# Patient Record
Sex: MALE | Race: WHITE | NOT HISPANIC OR LATINO | Employment: UNEMPLOYED | URBAN - METROPOLITAN AREA
[De-identification: names, ages, dates, MRNs, and addresses within clinical notes are randomized per-mention and may not be internally consistent; named-entity substitution may affect disease eponyms.]

---

## 2023-02-04 ENCOUNTER — HOSPITAL ENCOUNTER (INPATIENT)
Facility: MEDICAL CENTER | Age: 58
LOS: 1 days | DRG: 660 | End: 2023-02-05
Attending: EMERGENCY MEDICINE | Admitting: STUDENT IN AN ORGANIZED HEALTH CARE EDUCATION/TRAINING PROGRAM
Payer: COMMERCIAL

## 2023-02-04 ENCOUNTER — APPOINTMENT (OUTPATIENT)
Dept: RADIOLOGY | Facility: MEDICAL CENTER | Age: 58
DRG: 660 | End: 2023-02-04
Attending: EMERGENCY MEDICINE
Payer: COMMERCIAL

## 2023-02-04 DIAGNOSIS — N17.9 AKI (ACUTE KIDNEY INJURY) (HCC): ICD-10-CM

## 2023-02-04 DIAGNOSIS — R10.9 LEFT FLANK PAIN: ICD-10-CM

## 2023-02-04 DIAGNOSIS — R73.9 HYPERGLYCEMIA: ICD-10-CM

## 2023-02-04 DIAGNOSIS — N20.1 URETEROLITHIASIS: ICD-10-CM

## 2023-02-04 PROBLEM — N13.30 HYDRONEPHROSIS OF RIGHT KIDNEY: Status: ACTIVE | Noted: 2023-02-04

## 2023-02-04 PROBLEM — N20.0 NEPHROLITHIASIS: Status: ACTIVE | Noted: 2023-02-04

## 2023-02-04 PROBLEM — E87.29 HIGH ANION GAP METABOLIC ACIDOSIS: Status: ACTIVE | Noted: 2023-02-04

## 2023-02-04 PROBLEM — E11.9 TYPE 2 DIABETES MELLITUS (HCC): Status: ACTIVE | Noted: 2023-02-04

## 2023-02-04 PROBLEM — E87.1 HYPONATREMIA: Status: ACTIVE | Noted: 2023-02-04

## 2023-02-04 LAB
ALBUMIN SERPL BCP-MCNC: 4.1 G/DL (ref 3.2–4.9)
ALBUMIN/GLOB SERPL: 1.2 G/DL
ALP SERPL-CCNC: 148 U/L (ref 30–99)
ALT SERPL-CCNC: 8 U/L (ref 2–50)
ANION GAP SERPL CALC-SCNC: 19 MMOL/L (ref 7–16)
APPEARANCE UR: CLEAR
AST SERPL-CCNC: 13 U/L (ref 12–45)
B-OH-BUTYR SERPL-MCNC: 3.47 MMOL/L (ref 0.02–0.27)
BACTERIA #/AREA URNS HPF: NEGATIVE /HPF
BASOPHILS # BLD AUTO: 0.1 % (ref 0–1.8)
BASOPHILS # BLD: 0.01 K/UL (ref 0–0.12)
BILIRUB SERPL-MCNC: 0.9 MG/DL (ref 0.1–1.5)
BILIRUB UR QL STRIP.AUTO: NEGATIVE
BUN SERPL-MCNC: 37 MG/DL (ref 8–22)
CALCIUM ALBUM COR SERPL-MCNC: 9.3 MG/DL (ref 8.5–10.5)
CALCIUM SERPL-MCNC: 9.4 MG/DL (ref 8.5–10.5)
CHLORIDE SERPL-SCNC: 97 MMOL/L (ref 96–112)
CO2 SERPL-SCNC: 17 MMOL/L (ref 20–33)
COLOR UR: YELLOW
CREAT SERPL-MCNC: 3.32 MG/DL (ref 0.5–1.4)
EOSINOPHIL # BLD AUTO: 0 K/UL (ref 0–0.51)
EOSINOPHIL NFR BLD: 0 % (ref 0–6.9)
EPI CELLS #/AREA URNS HPF: ABNORMAL /HPF
ERYTHROCYTE [DISTWIDTH] IN BLOOD BY AUTOMATED COUNT: 43.4 FL (ref 35.9–50)
EST. AVERAGE GLUCOSE BLD GHB EST-MCNC: 258 MG/DL
GFR SERPLBLD CREATININE-BSD FMLA CKD-EPI: 21 ML/MIN/1.73 M 2
GLOBULIN SER CALC-MCNC: 3.5 G/DL (ref 1.9–3.5)
GLUCOSE SERPL-MCNC: 220 MG/DL (ref 65–99)
GLUCOSE UR STRIP.AUTO-MCNC: NEGATIVE MG/DL
HBA1C MFR BLD: 10.6 % (ref 4–5.6)
HCT VFR BLD AUTO: 43 % (ref 42–52)
HGB BLD-MCNC: 14.7 G/DL (ref 14–18)
HYALINE CASTS #/AREA URNS LPF: ABNORMAL /LPF
IMM GRANULOCYTES # BLD AUTO: 0.06 K/UL (ref 0–0.11)
IMM GRANULOCYTES NFR BLD AUTO: 0.6 % (ref 0–0.9)
KETONES UR STRIP.AUTO-MCNC: 40 MG/DL
LEUKOCYTE ESTERASE UR QL STRIP.AUTO: NEGATIVE
LIPASE SERPL-CCNC: 20 U/L (ref 11–82)
LYMPHOCYTES # BLD AUTO: 0.76 K/UL (ref 1–4.8)
LYMPHOCYTES NFR BLD: 7.7 % (ref 22–41)
MAGNESIUM SERPL-MCNC: 2.2 MG/DL (ref 1.5–2.5)
MCH RBC QN AUTO: 32.1 PG (ref 27–33)
MCHC RBC AUTO-ENTMCNC: 34.2 G/DL (ref 33.7–35.3)
MCV RBC AUTO: 93.9 FL (ref 81.4–97.8)
MICRO URNS: ABNORMAL
MONOCYTES # BLD AUTO: 0.4 K/UL (ref 0–0.85)
MONOCYTES NFR BLD AUTO: 4.1 % (ref 0–13.4)
NEUTROPHILS # BLD AUTO: 8.64 K/UL (ref 1.82–7.42)
NEUTROPHILS NFR BLD: 87.5 % (ref 44–72)
NITRITE UR QL STRIP.AUTO: NEGATIVE
NRBC # BLD AUTO: 0 K/UL
NRBC BLD-RTO: 0 /100 WBC
PH UR STRIP.AUTO: 5.5 [PH] (ref 5–8)
PLATELET # BLD AUTO: 250 K/UL (ref 164–446)
PMV BLD AUTO: 10.8 FL (ref 9–12.9)
POTASSIUM SERPL-SCNC: 4.7 MMOL/L (ref 3.6–5.5)
PROT SERPL-MCNC: 7.6 G/DL (ref 6–8.2)
PROT UR QL STRIP: NEGATIVE MG/DL
RBC # BLD AUTO: 4.58 M/UL (ref 4.7–6.1)
RBC # URNS HPF: ABNORMAL /HPF
RBC UR QL AUTO: ABNORMAL
SODIUM SERPL-SCNC: 133 MMOL/L (ref 135–145)
SP GR UR STRIP.AUTO: 1.01
UROBILINOGEN UR STRIP.AUTO-MCNC: 0.2 MG/DL
WBC # BLD AUTO: 9.9 K/UL (ref 4.8–10.8)
WBC #/AREA URNS HPF: ABNORMAL /HPF

## 2023-02-04 PROCEDURE — 81001 URINALYSIS AUTO W/SCOPE: CPT

## 2023-02-04 PROCEDURE — A9270 NON-COVERED ITEM OR SERVICE: HCPCS | Performed by: STUDENT IN AN ORGANIZED HEALTH CARE EDUCATION/TRAINING PROGRAM

## 2023-02-04 PROCEDURE — 700105 HCHG RX REV CODE 258: Performed by: EMERGENCY MEDICINE

## 2023-02-04 PROCEDURE — 85025 COMPLETE CBC W/AUTO DIFF WBC: CPT

## 2023-02-04 PROCEDURE — 99285 EMERGENCY DEPT VISIT HI MDM: CPT

## 2023-02-04 PROCEDURE — 36415 COLL VENOUS BLD VENIPUNCTURE: CPT

## 2023-02-04 PROCEDURE — 83036 HEMOGLOBIN GLYCOSYLATED A1C: CPT

## 2023-02-04 PROCEDURE — 82962 GLUCOSE BLOOD TEST: CPT

## 2023-02-04 PROCEDURE — 770006 HCHG ROOM/CARE - MED/SURG/GYN SEMI*

## 2023-02-04 PROCEDURE — 74176 CT ABD & PELVIS W/O CONTRAST: CPT

## 2023-02-04 PROCEDURE — 700102 HCHG RX REV CODE 250 W/ 637 OVERRIDE(OP): Performed by: STUDENT IN AN ORGANIZED HEALTH CARE EDUCATION/TRAINING PROGRAM

## 2023-02-04 PROCEDURE — 99222 1ST HOSP IP/OBS MODERATE 55: CPT | Performed by: STUDENT IN AN ORGANIZED HEALTH CARE EDUCATION/TRAINING PROGRAM

## 2023-02-04 PROCEDURE — 83690 ASSAY OF LIPASE: CPT

## 2023-02-04 PROCEDURE — 96375 TX/PRO/DX INJ NEW DRUG ADDON: CPT

## 2023-02-04 PROCEDURE — 83735 ASSAY OF MAGNESIUM: CPT

## 2023-02-04 PROCEDURE — 80053 COMPREHEN METABOLIC PANEL: CPT

## 2023-02-04 PROCEDURE — 96365 THER/PROPH/DIAG IV INF INIT: CPT

## 2023-02-04 PROCEDURE — 82010 KETONE BODYS QUAN: CPT

## 2023-02-04 PROCEDURE — 700111 HCHG RX REV CODE 636 W/ 250 OVERRIDE (IP): Performed by: EMERGENCY MEDICINE

## 2023-02-04 PROCEDURE — 700101 HCHG RX REV CODE 250: Performed by: EMERGENCY MEDICINE

## 2023-02-04 RX ORDER — SODIUM BICARBONATE IN D5W 150/1000ML
PLASTIC BAG, INJECTION (ML) INTRAVENOUS CONTINUOUS
Status: DISCONTINUED | OUTPATIENT
Start: 2023-02-04 | End: 2023-02-04

## 2023-02-04 RX ORDER — AMOXICILLIN 250 MG
2 CAPSULE ORAL 2 TIMES DAILY
Status: DISCONTINUED | OUTPATIENT
Start: 2023-02-04 | End: 2023-02-05 | Stop reason: HOSPADM

## 2023-02-04 RX ORDER — PROMETHAZINE HYDROCHLORIDE 25 MG/1
12.5-25 TABLET ORAL EVERY 4 HOURS PRN
Status: DISCONTINUED | OUTPATIENT
Start: 2023-02-04 | End: 2023-02-05 | Stop reason: HOSPADM

## 2023-02-04 RX ORDER — BISACODYL 10 MG
10 SUPPOSITORY, RECTAL RECTAL
Status: DISCONTINUED | OUTPATIENT
Start: 2023-02-04 | End: 2023-02-05 | Stop reason: HOSPADM

## 2023-02-04 RX ORDER — HYDROMORPHONE HYDROCHLORIDE 1 MG/ML
0.25 INJECTION, SOLUTION INTRAMUSCULAR; INTRAVENOUS; SUBCUTANEOUS EVERY 6 HOURS PRN
Status: DISCONTINUED | OUTPATIENT
Start: 2023-02-04 | End: 2023-02-05 | Stop reason: HOSPADM

## 2023-02-04 RX ORDER — SODIUM CHLORIDE 9 MG/ML
1000 INJECTION, SOLUTION INTRAVENOUS CONTINUOUS
Status: DISCONTINUED | OUTPATIENT
Start: 2023-02-04 | End: 2023-02-04

## 2023-02-04 RX ORDER — ACETAMINOPHEN 325 MG/1
650 TABLET ORAL EVERY 6 HOURS PRN
Status: DISCONTINUED | OUTPATIENT
Start: 2023-02-04 | End: 2023-02-05 | Stop reason: HOSPADM

## 2023-02-04 RX ORDER — LABETALOL HYDROCHLORIDE 5 MG/ML
10 INJECTION, SOLUTION INTRAVENOUS EVERY 4 HOURS PRN
Status: DISCONTINUED | OUTPATIENT
Start: 2023-02-04 | End: 2023-02-05 | Stop reason: HOSPADM

## 2023-02-04 RX ORDER — SODIUM CHLORIDE, SODIUM LACTATE, POTASSIUM CHLORIDE, CALCIUM CHLORIDE 600; 310; 30; 20 MG/100ML; MG/100ML; MG/100ML; MG/100ML
INJECTION, SOLUTION INTRAVENOUS CONTINUOUS
Status: DISCONTINUED | OUTPATIENT
Start: 2023-02-04 | End: 2023-02-04

## 2023-02-04 RX ORDER — PROCHLORPERAZINE EDISYLATE 5 MG/ML
5-10 INJECTION INTRAMUSCULAR; INTRAVENOUS EVERY 4 HOURS PRN
Status: DISCONTINUED | OUTPATIENT
Start: 2023-02-04 | End: 2023-02-05 | Stop reason: HOSPADM

## 2023-02-04 RX ORDER — POLYETHYLENE GLYCOL 3350 17 G/17G
1 POWDER, FOR SOLUTION ORAL
Status: DISCONTINUED | OUTPATIENT
Start: 2023-02-04 | End: 2023-02-05 | Stop reason: HOSPADM

## 2023-02-04 RX ORDER — ONDANSETRON 4 MG/1
4 TABLET, ORALLY DISINTEGRATING ORAL EVERY 4 HOURS PRN
Status: DISCONTINUED | OUTPATIENT
Start: 2023-02-04 | End: 2023-02-05 | Stop reason: HOSPADM

## 2023-02-04 RX ORDER — MORPHINE SULFATE 4 MG/ML
4 INJECTION INTRAVENOUS ONCE
Status: COMPLETED | OUTPATIENT
Start: 2023-02-04 | End: 2023-02-04

## 2023-02-04 RX ORDER — ONDANSETRON 2 MG/ML
4 INJECTION INTRAMUSCULAR; INTRAVENOUS EVERY 4 HOURS PRN
Status: DISCONTINUED | OUTPATIENT
Start: 2023-02-04 | End: 2023-02-05 | Stop reason: HOSPADM

## 2023-02-04 RX ORDER — ONDANSETRON 2 MG/ML
4 INJECTION INTRAMUSCULAR; INTRAVENOUS ONCE
Status: COMPLETED | OUTPATIENT
Start: 2023-02-04 | End: 2023-02-04

## 2023-02-04 RX ORDER — OXYCODONE HYDROCHLORIDE 5 MG/1
2.5 TABLET ORAL EVERY 6 HOURS PRN
Status: DISCONTINUED | OUTPATIENT
Start: 2023-02-04 | End: 2023-02-05 | Stop reason: HOSPADM

## 2023-02-04 RX ORDER — PROMETHAZINE HYDROCHLORIDE 25 MG/1
12.5-25 SUPPOSITORY RECTAL EVERY 4 HOURS PRN
Status: DISCONTINUED | OUTPATIENT
Start: 2023-02-04 | End: 2023-02-05 | Stop reason: HOSPADM

## 2023-02-04 RX ORDER — INSULIN LISPRO 100 [IU]/ML
1-6 INJECTION, SOLUTION INTRAVENOUS; SUBCUTANEOUS EVERY 6 HOURS
Status: DISCONTINUED | OUTPATIENT
Start: 2023-02-05 | End: 2023-02-05 | Stop reason: HOSPADM

## 2023-02-04 RX ORDER — OXYCODONE HYDROCHLORIDE 5 MG/1
5 TABLET ORAL EVERY 6 HOURS PRN
Status: DISCONTINUED | OUTPATIENT
Start: 2023-02-04 | End: 2023-02-05 | Stop reason: HOSPADM

## 2023-02-04 RX ADMIN — INSULIN GLARGINE-YFGN 15 UNITS: 100 INJECTION, SOLUTION SUBCUTANEOUS at 23:34

## 2023-02-04 RX ADMIN — INSULIN LISPRO 2 UNITS: 100 INJECTION, SOLUTION INTRAVENOUS; SUBCUTANEOUS at 23:35

## 2023-02-04 RX ADMIN — ONDANSETRON 4 MG: 2 INJECTION INTRAMUSCULAR; INTRAVENOUS at 17:31

## 2023-02-04 RX ADMIN — OXYCODONE 5 MG: 5 TABLET ORAL at 23:30

## 2023-02-04 RX ADMIN — SODIUM BICARBONATE: 84 INJECTION, SOLUTION INTRAVENOUS at 22:14

## 2023-02-04 RX ADMIN — SODIUM CHLORIDE 1000 ML: 9 INJECTION, SOLUTION INTRAVENOUS at 20:48

## 2023-02-04 RX ADMIN — MORPHINE SULFATE 4 MG: 4 INJECTION INTRAVENOUS at 17:31

## 2023-02-04 ASSESSMENT — ENCOUNTER SYMPTOMS
RESPIRATORY NEGATIVE: 1
PSYCHIATRIC NEGATIVE: 1
NAUSEA: 1
VOMITING: 1
ABDOMINAL PAIN: 1
NEUROLOGICAL NEGATIVE: 1
CARDIOVASCULAR NEGATIVE: 1
FLANK PAIN: 1
EYES NEGATIVE: 1
CONSTITUTIONAL NEGATIVE: 1

## 2023-02-04 ASSESSMENT — FIBROSIS 4 INDEX: FIB4 SCORE: 1.07

## 2023-02-04 ASSESSMENT — PAIN DESCRIPTION - PAIN TYPE: TYPE: ACUTE PAIN

## 2023-02-04 ASSESSMENT — PAIN DESCRIPTION - DESCRIPTORS: DESCRIPTORS: ACHING

## 2023-02-05 ENCOUNTER — APPOINTMENT (OUTPATIENT)
Dept: RADIOLOGY | Facility: MEDICAL CENTER | Age: 58
DRG: 660 | End: 2023-02-05
Attending: UROLOGY
Payer: COMMERCIAL

## 2023-02-05 ENCOUNTER — ANESTHESIA EVENT (OUTPATIENT)
Dept: SURGERY | Facility: MEDICAL CENTER | Age: 58
DRG: 660 | End: 2023-02-05
Payer: COMMERCIAL

## 2023-02-05 ENCOUNTER — ANESTHESIA (OUTPATIENT)
Dept: SURGERY | Facility: MEDICAL CENTER | Age: 58
DRG: 660 | End: 2023-02-05
Payer: COMMERCIAL

## 2023-02-05 VITALS
TEMPERATURE: 96.6 F | DIASTOLIC BLOOD PRESSURE: 87 MMHG | SYSTOLIC BLOOD PRESSURE: 150 MMHG | RESPIRATION RATE: 18 BRPM | HEART RATE: 77 BPM | OXYGEN SATURATION: 96 % | WEIGHT: 159.83 LBS

## 2023-02-05 LAB
ALBUMIN SERPL BCP-MCNC: 3.5 G/DL (ref 3.2–4.9)
ALBUMIN/GLOB SERPL: 1.3 G/DL
ALP SERPL-CCNC: 123 U/L (ref 30–99)
ALT SERPL-CCNC: 6 U/L (ref 2–50)
ANION GAP SERPL CALC-SCNC: 13 MMOL/L (ref 7–16)
AST SERPL-CCNC: 12 U/L (ref 12–45)
BASOPHILS # BLD AUTO: 0.2 % (ref 0–1.8)
BASOPHILS # BLD: 0.02 K/UL (ref 0–0.12)
BILIRUB SERPL-MCNC: 0.7 MG/DL (ref 0.1–1.5)
BUN SERPL-MCNC: 34 MG/DL (ref 8–22)
CALCIUM ALBUM COR SERPL-MCNC: 9.1 MG/DL (ref 8.5–10.5)
CALCIUM SERPL-MCNC: 8.7 MG/DL (ref 8.5–10.5)
CHLORIDE SERPL-SCNC: 100 MMOL/L (ref 96–112)
CO2 SERPL-SCNC: 20 MMOL/L (ref 20–33)
CREAT SERPL-MCNC: 2.5 MG/DL (ref 0.5–1.4)
EOSINOPHIL # BLD AUTO: 0.02 K/UL (ref 0–0.51)
EOSINOPHIL NFR BLD: 0.2 % (ref 0–6.9)
ERYTHROCYTE [DISTWIDTH] IN BLOOD BY AUTOMATED COUNT: 40.9 FL (ref 35.9–50)
GFR SERPLBLD CREATININE-BSD FMLA CKD-EPI: 29 ML/MIN/1.73 M 2
GLOBULIN SER CALC-MCNC: 2.6 G/DL (ref 1.9–3.5)
GLUCOSE BLD STRIP.AUTO-MCNC: 100 MG/DL (ref 65–99)
GLUCOSE BLD STRIP.AUTO-MCNC: 108 MG/DL (ref 65–99)
GLUCOSE BLD STRIP.AUTO-MCNC: 162 MG/DL (ref 65–99)
GLUCOSE BLD STRIP.AUTO-MCNC: 206 MG/DL (ref 65–99)
GLUCOSE BLD STRIP.AUTO-MCNC: 85 MG/DL (ref 65–99)
GLUCOSE SERPL-MCNC: 241 MG/DL (ref 65–99)
HCT VFR BLD AUTO: 36.7 % (ref 42–52)
HGB BLD-MCNC: 13 G/DL (ref 14–18)
IMM GRANULOCYTES # BLD AUTO: 0.05 K/UL (ref 0–0.11)
IMM GRANULOCYTES NFR BLD AUTO: 0.5 % (ref 0–0.9)
LACTATE SERPL-SCNC: 1 MMOL/L (ref 0.5–2)
LACTATE SERPL-SCNC: 1.1 MMOL/L (ref 0.5–2)
LYMPHOCYTES # BLD AUTO: 1.4 K/UL (ref 1–4.8)
LYMPHOCYTES NFR BLD: 15.1 % (ref 22–41)
MCH RBC QN AUTO: 32.5 PG (ref 27–33)
MCHC RBC AUTO-ENTMCNC: 35.4 G/DL (ref 33.7–35.3)
MCV RBC AUTO: 91.8 FL (ref 81.4–97.8)
MONOCYTES # BLD AUTO: 0.93 K/UL (ref 0–0.85)
MONOCYTES NFR BLD AUTO: 10 % (ref 0–13.4)
NEUTROPHILS # BLD AUTO: 6.85 K/UL (ref 1.82–7.42)
NEUTROPHILS NFR BLD: 74 % (ref 44–72)
NRBC # BLD AUTO: 0 K/UL
NRBC BLD-RTO: 0 /100 WBC
PLATELET # BLD AUTO: 221 K/UL (ref 164–446)
PMV BLD AUTO: 10.7 FL (ref 9–12.9)
POTASSIUM SERPL-SCNC: 3.6 MMOL/L (ref 3.6–5.5)
PROT SERPL-MCNC: 6.1 G/DL (ref 6–8.2)
RBC # BLD AUTO: 4 M/UL (ref 4.7–6.1)
SODIUM SERPL-SCNC: 133 MMOL/L (ref 135–145)
WBC # BLD AUTO: 9.3 K/UL (ref 4.8–10.8)

## 2023-02-05 PROCEDURE — 0TC78ZZ EXTIRPATION OF MATTER FROM LEFT URETER, VIA NATURAL OR ARTIFICIAL OPENING ENDOSCOPIC: ICD-10-PCS | Performed by: UROLOGY

## 2023-02-05 PROCEDURE — 160039 HCHG SURGERY MINUTES - EA ADDL 1 MIN LEVEL 3: Performed by: UROLOGY

## 2023-02-05 PROCEDURE — 700111 HCHG RX REV CODE 636 W/ 250 OVERRIDE (IP): Performed by: STUDENT IN AN ORGANIZED HEALTH CARE EDUCATION/TRAINING PROGRAM

## 2023-02-05 PROCEDURE — C1769 GUIDE WIRE: HCPCS | Performed by: UROLOGY

## 2023-02-05 PROCEDURE — C2617 STENT, NON-COR, TEM W/O DEL: HCPCS | Performed by: UROLOGY

## 2023-02-05 PROCEDURE — 00910 ANES TRANSURETHRAL PX NOS: CPT | Performed by: ANESTHESIOLOGY

## 2023-02-05 PROCEDURE — 160035 HCHG PACU - 1ST 60 MINS PHASE I: Performed by: UROLOGY

## 2023-02-05 PROCEDURE — 700101 HCHG RX REV CODE 250: Performed by: ANESTHESIOLOGY

## 2023-02-05 PROCEDURE — A9270 NON-COVERED ITEM OR SERVICE: HCPCS | Performed by: STUDENT IN AN ORGANIZED HEALTH CARE EDUCATION/TRAINING PROGRAM

## 2023-02-05 PROCEDURE — 700102 HCHG RX REV CODE 250 W/ 637 OVERRIDE(OP): Performed by: STUDENT IN AN ORGANIZED HEALTH CARE EDUCATION/TRAINING PROGRAM

## 2023-02-05 PROCEDURE — 85025 COMPLETE CBC W/AUTO DIFF WBC: CPT

## 2023-02-05 PROCEDURE — 82365 CALCULUS SPECTROSCOPY: CPT

## 2023-02-05 PROCEDURE — 0T778DZ DILATION OF LEFT URETER WITH INTRALUMINAL DEVICE, VIA NATURAL OR ARTIFICIAL OPENING ENDOSCOPIC: ICD-10-PCS | Performed by: UROLOGY

## 2023-02-05 PROCEDURE — 36415 COLL VENOUS BLD VENIPUNCTURE: CPT

## 2023-02-05 PROCEDURE — 160028 HCHG SURGERY MINUTES - 1ST 30 MINS LEVEL 3: Performed by: UROLOGY

## 2023-02-05 PROCEDURE — 700111 HCHG RX REV CODE 636 W/ 250 OVERRIDE (IP): Performed by: NURSE PRACTITIONER

## 2023-02-05 PROCEDURE — 700105 HCHG RX REV CODE 258: Performed by: STUDENT IN AN ORGANIZED HEALTH CARE EDUCATION/TRAINING PROGRAM

## 2023-02-05 PROCEDURE — 160009 HCHG ANES TIME/MIN: Performed by: UROLOGY

## 2023-02-05 PROCEDURE — 87086 URINE CULTURE/COLONY COUNT: CPT

## 2023-02-05 PROCEDURE — 160002 HCHG RECOVERY MINUTES (STAT): Performed by: UROLOGY

## 2023-02-05 PROCEDURE — 160048 HCHG OR STATISTICAL LEVEL 1-5: Performed by: UROLOGY

## 2023-02-05 PROCEDURE — 80053 COMPREHEN METABOLIC PANEL: CPT

## 2023-02-05 PROCEDURE — 82962 GLUCOSE BLOOD TEST: CPT | Mod: 91

## 2023-02-05 PROCEDURE — 88300 SURGICAL PATH GROSS: CPT

## 2023-02-05 PROCEDURE — 83605 ASSAY OF LACTIC ACID: CPT | Mod: 91

## 2023-02-05 PROCEDURE — C1758 CATHETER, URETERAL: HCPCS | Performed by: UROLOGY

## 2023-02-05 PROCEDURE — 99238 HOSP IP/OBS DSCHRG MGMT 30/<: CPT | Performed by: NURSE PRACTITIONER

## 2023-02-05 PROCEDURE — 700111 HCHG RX REV CODE 636 W/ 250 OVERRIDE (IP): Performed by: ANESTHESIOLOGY

## 2023-02-05 PROCEDURE — 700105 HCHG RX REV CODE 258: Performed by: ANESTHESIOLOGY

## 2023-02-05 DEVICE — STENT UROLOGICAL POLARIS 6X24  ULTRA: Type: IMPLANTABLE DEVICE | Site: URETER | Status: FUNCTIONAL

## 2023-02-05 RX ORDER — HALOPERIDOL 5 MG/ML
1 INJECTION INTRAMUSCULAR
Status: DISCONTINUED | OUTPATIENT
Start: 2023-02-05 | End: 2023-02-05 | Stop reason: HOSPADM

## 2023-02-05 RX ORDER — DIPHENHYDRAMINE HYDROCHLORIDE 50 MG/ML
12.5 INJECTION INTRAMUSCULAR; INTRAVENOUS
Status: DISCONTINUED | OUTPATIENT
Start: 2023-02-05 | End: 2023-02-05 | Stop reason: HOSPADM

## 2023-02-05 RX ORDER — SODIUM CHLORIDE, SODIUM LACTATE, POTASSIUM CHLORIDE, CALCIUM CHLORIDE 600; 310; 30; 20 MG/100ML; MG/100ML; MG/100ML; MG/100ML
INJECTION, SOLUTION INTRAVENOUS CONTINUOUS
Status: DISCONTINUED | OUTPATIENT
Start: 2023-02-05 | End: 2023-02-05

## 2023-02-05 RX ORDER — ONDANSETRON 2 MG/ML
4 INJECTION INTRAMUSCULAR; INTRAVENOUS
Status: DISCONTINUED | OUTPATIENT
Start: 2023-02-05 | End: 2023-02-05 | Stop reason: HOSPADM

## 2023-02-05 RX ORDER — IPRATROPIUM BROMIDE AND ALBUTEROL SULFATE 2.5; .5 MG/3ML; MG/3ML
3 SOLUTION RESPIRATORY (INHALATION)
Status: DISCONTINUED | OUTPATIENT
Start: 2023-02-05 | End: 2023-02-05 | Stop reason: HOSPADM

## 2023-02-05 RX ORDER — OXYCODONE HYDROCHLORIDE AND ACETAMINOPHEN 5; 325 MG/1; MG/1
1 TABLET ORAL
Status: DISCONTINUED | OUTPATIENT
Start: 2023-02-05 | End: 2023-02-05 | Stop reason: HOSPADM

## 2023-02-05 RX ORDER — HYDRALAZINE HYDROCHLORIDE 20 MG/ML
5 INJECTION INTRAMUSCULAR; INTRAVENOUS
Status: DISCONTINUED | OUTPATIENT
Start: 2023-02-05 | End: 2023-02-05 | Stop reason: HOSPADM

## 2023-02-05 RX ORDER — CEFAZOLIN SODIUM 1 G/3ML
INJECTION, POWDER, FOR SOLUTION INTRAMUSCULAR; INTRAVENOUS PRN
Status: DISCONTINUED | OUTPATIENT
Start: 2023-02-05 | End: 2023-02-05 | Stop reason: SURG

## 2023-02-05 RX ORDER — OXYCODONE HYDROCHLORIDE AND ACETAMINOPHEN 5; 325 MG/1; MG/1
2 TABLET ORAL
Status: DISCONTINUED | OUTPATIENT
Start: 2023-02-05 | End: 2023-02-05 | Stop reason: HOSPADM

## 2023-02-05 RX ORDER — HYDROMORPHONE HYDROCHLORIDE 1 MG/ML
0.2 INJECTION, SOLUTION INTRAMUSCULAR; INTRAVENOUS; SUBCUTANEOUS
Status: DISCONTINUED | OUTPATIENT
Start: 2023-02-05 | End: 2023-02-05 | Stop reason: HOSPADM

## 2023-02-05 RX ORDER — MEPERIDINE HYDROCHLORIDE 25 MG/ML
12.5 INJECTION INTRAMUSCULAR; INTRAVENOUS; SUBCUTANEOUS
Status: DISCONTINUED | OUTPATIENT
Start: 2023-02-05 | End: 2023-02-05 | Stop reason: HOSPADM

## 2023-02-05 RX ORDER — HYDROMORPHONE HYDROCHLORIDE 1 MG/ML
0.4 INJECTION, SOLUTION INTRAMUSCULAR; INTRAVENOUS; SUBCUTANEOUS
Status: DISCONTINUED | OUTPATIENT
Start: 2023-02-05 | End: 2023-02-05 | Stop reason: HOSPADM

## 2023-02-05 RX ORDER — MIDAZOLAM HYDROCHLORIDE 1 MG/ML
INJECTION INTRAMUSCULAR; INTRAVENOUS PRN
Status: DISCONTINUED | OUTPATIENT
Start: 2023-02-05 | End: 2023-02-05 | Stop reason: SURG

## 2023-02-05 RX ORDER — ONDANSETRON 2 MG/ML
INJECTION INTRAMUSCULAR; INTRAVENOUS PRN
Status: DISCONTINUED | OUTPATIENT
Start: 2023-02-05 | End: 2023-02-05 | Stop reason: SURG

## 2023-02-05 RX ORDER — HYDROMORPHONE HYDROCHLORIDE 1 MG/ML
0.1 INJECTION, SOLUTION INTRAMUSCULAR; INTRAVENOUS; SUBCUTANEOUS
Status: DISCONTINUED | OUTPATIENT
Start: 2023-02-05 | End: 2023-02-05 | Stop reason: HOSPADM

## 2023-02-05 RX ORDER — METOPROLOL TARTRATE 1 MG/ML
1 INJECTION, SOLUTION INTRAVENOUS
Status: DISCONTINUED | OUTPATIENT
Start: 2023-02-05 | End: 2023-02-05 | Stop reason: HOSPADM

## 2023-02-05 RX ORDER — LABETALOL HYDROCHLORIDE 5 MG/ML
5 INJECTION, SOLUTION INTRAVENOUS
Status: DISCONTINUED | OUTPATIENT
Start: 2023-02-05 | End: 2023-02-05 | Stop reason: HOSPADM

## 2023-02-05 RX ORDER — SODIUM CHLORIDE, SODIUM LACTATE, POTASSIUM CHLORIDE, CALCIUM CHLORIDE 600; 310; 30; 20 MG/100ML; MG/100ML; MG/100ML; MG/100ML
INJECTION, SOLUTION INTRAVENOUS
Status: DISCONTINUED | OUTPATIENT
Start: 2023-02-05 | End: 2023-02-05 | Stop reason: SURG

## 2023-02-05 RX ORDER — LIDOCAINE HYDROCHLORIDE 20 MG/ML
INJECTION, SOLUTION EPIDURAL; INFILTRATION; INTRACAUDAL; PERINEURAL PRN
Status: DISCONTINUED | OUTPATIENT
Start: 2023-02-05 | End: 2023-02-05 | Stop reason: SURG

## 2023-02-05 RX ADMIN — CEFTRIAXONE SODIUM 1000 MG: 10 INJECTION, POWDER, FOR SOLUTION INTRAVENOUS at 14:15

## 2023-02-05 RX ADMIN — SODIUM CHLORIDE, POTASSIUM CHLORIDE, SODIUM LACTATE AND CALCIUM CHLORIDE: 600; 310; 30; 20 INJECTION, SOLUTION INTRAVENOUS at 12:26

## 2023-02-05 RX ADMIN — SODIUM BICARBONATE 75 MEQ: 84 INJECTION, SOLUTION INTRAVENOUS at 01:29

## 2023-02-05 RX ADMIN — MIDAZOLAM HYDROCHLORIDE 2 MG: 1 INJECTION, SOLUTION INTRAMUSCULAR; INTRAVENOUS at 12:31

## 2023-02-05 RX ADMIN — PROPOFOL 150 MG: 10 INJECTION, EMULSION INTRAVENOUS at 12:34

## 2023-02-05 RX ADMIN — ONDANSETRON 4 MG: 2 INJECTION INTRAMUSCULAR; INTRAVENOUS at 12:59

## 2023-02-05 RX ADMIN — HYDROMORPHONE HYDROCHLORIDE 0.25 MG: 1 INJECTION, SOLUTION INTRAMUSCULAR; INTRAVENOUS; SUBCUTANEOUS at 08:40

## 2023-02-05 RX ADMIN — FENTANYL CITRATE 50 MCG: 50 INJECTION, SOLUTION INTRAMUSCULAR; INTRAVENOUS at 12:32

## 2023-02-05 RX ADMIN — FENTANYL CITRATE 50 MCG: 50 INJECTION, SOLUTION INTRAMUSCULAR; INTRAVENOUS at 12:49

## 2023-02-05 RX ADMIN — OXYCODONE 5 MG: 5 TABLET ORAL at 07:30

## 2023-02-05 RX ADMIN — PROPOFOL 50 MG: 10 INJECTION, EMULSION INTRAVENOUS at 12:59

## 2023-02-05 RX ADMIN — CEFAZOLIN 2 G: 330 INJECTION, POWDER, FOR SOLUTION INTRAMUSCULAR; INTRAVENOUS at 12:34

## 2023-02-05 RX ADMIN — LIDOCAINE HYDROCHLORIDE 80 MG: 20 INJECTION, SOLUTION EPIDURAL; INFILTRATION; INTRACAUDAL at 12:34

## 2023-02-05 ASSESSMENT — LIFESTYLE VARIABLES
HAVE PEOPLE ANNOYED YOU BY CRITICIZING YOUR DRINKING: NO
HAVE YOU EVER FELT YOU SHOULD CUT DOWN ON YOUR DRINKING: NO
EVER FELT BAD OR GUILTY ABOUT YOUR DRINKING: NO
CONSUMPTION TOTAL: NEGATIVE
TOTAL SCORE: 0
HOW MANY TIMES IN THE PAST YEAR HAVE YOU HAD 5 OR MORE DRINKS IN A DAY: 0
EVER HAD A DRINK FIRST THING IN THE MORNING TO STEADY YOUR NERVES TO GET RID OF A HANGOVER: NO
TOTAL SCORE: 0
ON A TYPICAL DAY WHEN YOU DRINK ALCOHOL HOW MANY DRINKS DO YOU HAVE: 0
AVERAGE NUMBER OF DAYS PER WEEK YOU HAVE A DRINK CONTAINING ALCOHOL: 0
TOTAL SCORE: 0
ALCOHOL_USE: NO
DOES PATIENT WANT TO STOP DRINKING: NO

## 2023-02-05 ASSESSMENT — COGNITIVE AND FUNCTIONAL STATUS - GENERAL
DAILY ACTIVITIY SCORE: 24
SUGGESTED CMS G CODE MODIFIER DAILY ACTIVITY: CH
MOBILITY SCORE: 24
SUGGESTED CMS G CODE MODIFIER MOBILITY: CH

## 2023-02-05 ASSESSMENT — PAIN DESCRIPTION - PAIN TYPE
TYPE: SURGICAL PAIN
TYPE: ACUTE PAIN
TYPE: SURGICAL PAIN
TYPE: SURGICAL PAIN

## 2023-02-05 ASSESSMENT — PATIENT HEALTH QUESTIONNAIRE - PHQ9
1. LITTLE INTEREST OR PLEASURE IN DOING THINGS: NOT AT ALL
SUM OF ALL RESPONSES TO PHQ9 QUESTIONS 1 AND 2: 0
2. FEELING DOWN, DEPRESSED, IRRITABLE, OR HOPELESS: NOT AT ALL

## 2023-02-05 ASSESSMENT — PAIN SCALES - GENERAL: PAIN_LEVEL: 0

## 2023-02-05 NOTE — ED TRIAGE NOTES
Comes in with complaints of right sided abd/flank pain.  Some nausea, no vomiting, positive difficulty with urination, no noted pain.  No fevers.  Patient is a diabetic and has not been taking his medications.  Positive diarrhea but states this has been ongoing for the last year.

## 2023-02-05 NOTE — CARE PLAN
The patient is Stable - Low risk of patient condition declining or worsening    Shift Goals  Clinical Goals: IV fluids, pain mgnmt, rest  Patient Goals: Rest, surgery in morning.    Progress made toward(s) clinical / shift goals:  Iv fluids infusing, medicated for pain per MAR.     Problem: Knowledge Deficit - Standard  Goal: Patient and family/care givers will demonstrate understanding of plan of care, disease process/condition, diagnostic tests and medications  Outcome: Progressing     Problem: Pain - Standard  Goal: Alleviation of pain or a reduction in pain to the patient’s comfort goal  Outcome: Progressing

## 2023-02-05 NOTE — ASSESSMENT & PLAN NOTE
Admits to medication noncompliance  Diabetic diet  Continue home Lantus  Insulin sliding scale  Repeat A1c

## 2023-02-05 NOTE — ASSESSMENT & PLAN NOTE
Likely secondary to mild DKA in the setting of diabetic medicine noncompliance and high hydroxybutyric acid versus lactic acidosis  Pending lactic acid  IV hydration and insulin  Was started on bicarbonate drip as per nephrology's recommendation  Labs on a.m.

## 2023-02-05 NOTE — ANESTHESIA PREPROCEDURE EVALUATION
Case: [id number] Date: 02/05/23    Procedures:       CYSTOSCOPY, WITH URETERAL STENT INSERTION      URETEROSCOPY      LITHOTRIPSY, USING LASER    Location: SURGERY [facility]    Surgeons: Pranav Pierce M.D.          Relevant Problems      (positive) Acute renal failure (ARF) (HCC)   (positive) Hydronephrosis of right kidney   (positive) Nephrolithiasis      ENDO   (positive) Type 2 diabetes mellitus (HCC)   Cr 2.5      Physical Exam    Airway   Mallampati: II  TM distance: >3 FB  Neck ROM: full       Cardiovascular - normal exam  Rhythm: regular  Rate: normal  (-) murmur     Dental         Very poor dentition   Pulmonary - normal exam  Breath sounds clear to auscultation     Abdominal    Neurological - normal exam               Anesthesia Plan    ASA 3- EMERGENT (obstructive uropathy)   ASA physical status 3 criteria: diabetes - poorly controlled    Plan - general       Airway plan will be LMA          Induction: intravenous    Postoperative Plan: Postoperative administration of opioids is intended.    Pertinent diagnostic labs and testing reviewed    Informed Consent:    Anesthetic plan and risks discussed with patient.

## 2023-02-05 NOTE — CONSULTS
UROLOGY Consult Note:    ULYSSES Martinez  Date & Time note created:    2/5/2023   10:30 AM     Referring MD:  Dr. Calle    Patient ID:   Name:             Mandeep Ralph     YOB: 1965  Age:                 58 y.o.  male   MRN:               [id number]                                                             Reason for Consult:      Left Obstructing Ureteral Stone    History of Present Illness:    This is a 58 y.o. M with PMHx of diabetes who presented to the ED 2/04 with intractable left flank and left lower quadrant abdominal pain that has been present since 2/01. CT demonstrated 3 mm left UVJ stone with upstream hydro, also with perinephric stranding and possible 2 mm stone in the bladder. Patient denies any stone history. He reports he is unaware of any prior issues with his kidneys. BENTLEY on presentation with creatinine of 3.32, improved to 2.5. He denies fevers, chills, hematuria, dysuria, or increased frequency of urination. UA not suggestive of infection, no antibiotics on board currently. Afebrile, VSS with mildly elevated blood pressure. Patient is NPO for possible surgical intervention.     Review of Systems:      Constitutional: Denies fevers, chills  Gastrointestinal/Hepatic: Reports LLQ abdominal pain   Genitourinary: Denies hematuria, dysuria or frequency    All other systems were reviewed and are negative (AMA/CMS criteria)                Past Medical History:   Past Medical History:   Diagnosis Date    Diabetes (HCC)      Active Hospital Problems    Diagnosis     Acute renal failure (ARF) ([facility]) [N17.9]     Hydronephrosis of right kidney [N13.30]     Nephrolithiasis [N20.0]     Type 2 diabetes mellitus (HCC) [E11.9]     High anion gap metabolic acidosis [E87.29]     Hyponatremia [E87.1]        Past Surgical History:  History reviewed. No pertinent surgical history.    Hospital Medications:    Current Facility-Administered Medications:     insulin GLARGINE (Lantus,Semglee)  injection, 5 Units, Subcutaneous, Q EVENING, Cyrus Patel M.D.    senna-docusate (PERICOLACE or SENOKOT S) 8.6-50 MG per tablet 2 Tablet, 2 Tablet, Oral, BID **AND** polyethylene glycol/lytes (MIRALAX) PACKET 1 Packet, 1 Packet, Oral, QDAY PRN **AND** magnesium hydroxide (MILK OF MAGNESIA) suspension 30 mL, 30 mL, Oral, QDAY PRN **AND** bisacodyl (DULCOLAX) suppository 10 mg, 10 mg, Rectal, QDAY PRN, Fahad Byers M.D.    acetaminophen (Tylenol) tablet 650 mg, 650 mg, Oral, Q6HRS PRN, Fahad Byers M.D.    Notify provider if pain remains uncontrolled, , , CONTINUOUS **AND** Use the Numeric Rating Scale (NRS), Cronin-Baker Faces (WBF), or FLACC on regular floors and Critical-Care Pain Observation Tool (CPOT) on ICUs/Trauma to assess pain, , , CONTINUOUS **AND** Pulse Ox, , , CONTINUOUS **AND** Pharmacy Consult Request ...Pain Management Review 1 Each, 1 Each, Other, PHARMACY TO DOSE **AND** If patient difficult to arouse and/or has respiratory depression (respiratory rate of 10 or less), stop any opiates that are currently infusing and call a Rapid Response., , , CONTINUOUS, Fahad Byers M.D.    oxyCODONE immediate-release (ROXICODONE) tablet 2.5 mg, 2.5 mg, Oral, Q6HRS PRN **OR** oxyCODONE immediate-release (ROXICODONE) tablet 5 mg, 5 mg, Oral, Q6HRS PRN, 5 mg at 02/05/23 0730 **OR** HYDROmorphone (Dilaudid) injection 0.25 mg, 0.25 mg, Intravenous, Q6HRS PRN, Fahad Byers M.D., 0.25 mg at 02/05/23 0840    labetalol (NORMODYNE/TRANDATE) injection 10 mg, 10 mg, Intravenous, Q4HRS PRN, Fahad Byers M.D.    ondansetron (ZOFRAN) syringe/vial injection 4 mg, 4 mg, Intravenous, Q4HRS PRN, Fahad Byers M.D.    ondansetron (ZOFRAN ODT) dispertab 4 mg, 4 mg, Oral, Q4HRS PRN, Fahad Byers M.D.    promethazine (PHENERGAN) tablet 12.5-25 mg, 12.5-25 mg, Oral, Q4HRS PRN, Fahad Byers M.D.    promethazine (PHENERGAN) suppository  12.5-25 mg, 12.5-25 mg, Rectal, Q4HRS PRN, Fahad Byers M.D.    prochlorperazine (COMPAZINE) injection 5-10 mg, 5-10 mg, Intravenous, Q4HRS PRN, Fahad Byers M.D.    insulin lispro (AdmeLOG,HumaLOG) injection, 1-6 Units, Subcutaneous, Q6HRS, 2 Units at 02/04/23 2335 **AND** POC blood glucose manual result, , , Q6H **AND** NOTIFY MD and PharmD, , , Once **AND** Administer 20 grams of glucose (approximately 8 ounces of fruit juice) every 15 minutes PRN FSBG less than 70 mg/dL, , , PRN **AND** dextrose 10 % BOLUS 25 g, 25 g, Intravenous, Q15 MIN PRN, Fahad Byers M.D.    sodium bicarbonate 75 mEq in 1/2 NS 1,000 mL infusion, 75 mEq, Intravenous, Continuous, Fahad Byers M.D., Last Rate: 100 mL/hr at 02/05/23 0129, 75 mEq at 02/05/23 0129    Current Outpatient Medications:  Medications Prior to Admission   Medication Sig Dispense Refill Last Dose    metformin (GLUCOPHAGE) 1000 MG tablet Take 1 Tab by mouth 2 times a day, with meals. 60 Tab 3     glimepiride (AMARYL) 4 MG Tab Take 1 Tab by mouth every morning. 30 Tab 3     sitagliptin (JANUVIA) 100 MG TABS Take 1 Tab by mouth every day. Patient needs a office visit before meds can be refilled again. 30 Tab 0        Medication Allergy:  Allergies   Allergen Reactions    Penicillins        Family History:  History reviewed. No pertinent family history.    Social History:  Social History     Socioeconomic History    Marital status:      Spouse name: Not on file    Number of children: Not on file    Years of education: Not on file    Highest education level: Not on file   Occupational History    Not on file   Tobacco Use    Smoking status: Never    Smokeless tobacco: Never   Substance and Sexual Activity    Alcohol use: Not Currently    Drug use: Not Currently    Sexual activity: Not on file   Other Topics Concern    Not on file   Social History Narrative    Not on file     Social Determinants of Health     Financial  Resource Strain: Not on file   Food Insecurity: Not on file   Transportation Needs: Not on file   Physical Activity: Not on file   Stress: Not on file   Social Connections: Not on file   Intimate Partner Violence: Not on file   Housing Stability: Not on file         Physical Exam:  Vitals/ General Appearance:   Weight/BMI: There is no height or weight on file to calculate BMI.  BP (!) 142/82   Pulse 74   Temp 36.6 °C (97.9 °F) (Temporal)   Resp 17   Wt 72.5 kg (159 lb 13.3 oz)   SpO2 94%   Vitals:    02/04/23 2330 02/05/23 0000 02/05/23 0334 02/05/23 0820   BP:   (!) 147/78 (!) 142/82   Pulse:   85 74   Resp: 17 18 18 17   Temp:   36.3 °C (97.3 °F) 36.6 °C (97.9 °F)   TempSrc:   Temporal Temporal   SpO2:   97% 94%   Weight:         Oxygen Therapy:  Pulse Oximetry: 94 %, O2 (LPM): 0, O2 Delivery Device: None - Room Air    Constitutional:   Well developed, Well nourished, No acute distress  HENMT:  Normocephalic, Atraumatic.  Eyes:  EOMI, Conjunctiva normal, No discharge.  Neck:  Normal range of motion, No cervical tenderness.  Cardiovascular:  No cyanosis or edema.  Lungs:  Normal breath sounds.   Abdomen: Soft, No tenderness.   Skin: Warm, Dry, No erythema, No rash, no induration.  Neurologic: Alert & oriented x 3, No focal deficits noted.  Psychiatric: Affect normal, Judgment normal, Mood normal.      MDM (Data Review):     Records reviewed and summarized in current documentation    Lab Data Review:  Recent Results (from the past 24 hour(s))   CBC WITH DIFFERENTIAL    Collection Time: 02/04/23  5:04 PM   Result Value Ref Range    WBC 9.9 4.8 - 10.8 K/uL    RBC 4.58 (L) 4.70 - 6.10 M/uL    Hemoglobin 14.7 14.0 - 18.0 g/dL    Hematocrit 43.0 42.0 - 52.0 %    MCV 93.9 81.4 - 97.8 fL    MCH 32.1 27.0 - 33.0 pg    MCHC 34.2 33.7 - 35.3 g/dL    RDW 43.4 35.9 - 50.0 fL    Platelet Count 250 164 - 446 K/uL    MPV 10.8 9.0 - 12.9 fL    Neutrophils-Polys 87.50 (H) 44.00 - 72.00 %    Lymphocytes 7.70 (L) 22.00 - 41.00 %     Monocytes 4.10 0.00 - 13.40 %    Eosinophils 0.00 0.00 - 6.90 %    Basophils 0.10 0.00 - 1.80 %    Immature Granulocytes 0.60 0.00 - 0.90 %    Nucleated RBC 0.00 /100 WBC    Neutrophils (Absolute) 8.64 (H) 1.82 - 7.42 K/uL    Lymphs (Absolute) 0.76 (L) 1.00 - 4.80 K/uL    Monos (Absolute) 0.40 0.00 - 0.85 K/uL    Eos (Absolute) 0.00 0.00 - 0.51 K/uL    Baso (Absolute) 0.01 0.00 - 0.12 K/uL    Immature Granulocytes (abs) 0.06 0.00 - 0.11 K/uL    NRBC (Absolute) 0.00 K/uL   COMP METABOLIC PANEL    Collection Time: 02/04/23  5:04 PM   Result Value Ref Range    Sodium 133 (L) 135 - 145 mmol/L    Potassium 4.7 3.6 - 5.5 mmol/L    Chloride 97 96 - 112 mmol/L    Co2 17 (L) 20 - 33 mmol/L    Anion Gap 19.0 (H) 7.0 - 16.0    Glucose 220 (H) 65 - 99 mg/dL    Bun 37 (H) 8 - 22 mg/dL    Creatinine 3.32 (H) 0.50 - 1.40 mg/dL    Calcium 9.4 8.5 - 10.5 mg/dL    AST(SGOT) 13 12 - 45 U/L    ALT(SGPT) 8 2 - 50 U/L    Alkaline Phosphatase 148 (H) 30 - 99 U/L    Total Bilirubin 0.9 0.1 - 1.5 mg/dL    Albumin 4.1 3.2 - 4.9 g/dL    Total Protein 7.6 6.0 - 8.2 g/dL    Globulin 3.5 1.9 - 3.5 g/dL    A-G Ratio 1.2 g/dL   LIPASE    Collection Time: 02/04/23  5:04 PM   Result Value Ref Range    Lipase 20 11 - 82 U/L   ESTIMATED GFR    Collection Time: 02/04/23  5:04 PM   Result Value Ref Range    GFR (CKD-EPI) 21 (A) >60 mL/min/1.73 m 2   CORRECTED CALCIUM    Collection Time: 02/04/23  5:04 PM   Result Value Ref Range    Correct Calcium 9.3 8.5 - 10.5 mg/dL   BETA-HYDROXYBUTYRIC ACID    Collection Time: 02/04/23  5:04 PM   Result Value Ref Range    beta-Hydroxybutyric Acid 3.47 (H) 0.02 - 0.27 mmol/L   MAGNESIUM    Collection Time: 02/04/23  5:04 PM   Result Value Ref Range    Magnesium 2.2 1.5 - 2.5 mg/dL   HEMOGLOBIN A1C    Collection Time: 02/04/23  5:04 PM   Result Value Ref Range    Glycohemoglobin 10.6 (H) 4.0 - 5.6 %    Est Avg Glucose 258 mg/dL   URINALYSIS    Collection Time: 02/04/23  7:07 PM    Specimen: Urine   Result Value Ref  Range    Color Yellow     Character Clear     Specific Gravity 1.015 <1.035    Ph 5.5 5.0 - 8.0    Glucose Negative Negative mg/dL    Ketones 40 (A) Negative mg/dL    Protein Negative Negative mg/dL    Bilirubin Negative Negative    Urobilinogen, Urine 0.2 Negative    Nitrite Negative Negative    Leukocyte Esterase Negative Negative    Occult Blood Moderate (A) Negative    Micro Urine Req Microscopic    URINE MICROSCOPIC (W/UA)    Collection Time: 02/04/23  7:07 PM   Result Value Ref Range    WBC 10-20 (A) /hpf    RBC 20-50 (A) /hpf    Bacteria Negative None /hpf    Epithelial Cells Few /hpf    Hyaline Cast 0-2 /lpf   POCT glucose device results    Collection Time: 02/04/23 11:33 PM   Result Value Ref Range    POC Glucose, Blood 206 (H) 65 - 99 mg/dL   CBC with Differential    Collection Time: 02/05/23  1:04 AM   Result Value Ref Range    WBC 9.3 4.8 - 10.8 K/uL    RBC 4.00 (L) 4.70 - 6.10 M/uL    Hemoglobin 13.0 (L) 14.0 - 18.0 g/dL    Hematocrit 36.7 (L) 42.0 - 52.0 %    MCV 91.8 81.4 - 97.8 fL    MCH 32.5 27.0 - 33.0 pg    MCHC 35.4 (H) 33.7 - 35.3 g/dL    RDW 40.9 35.9 - 50.0 fL    Platelet Count 221 164 - 446 K/uL    MPV 10.7 9.0 - 12.9 fL    Neutrophils-Polys 74.00 (H) 44.00 - 72.00 %    Lymphocytes 15.10 (L) 22.00 - 41.00 %    Monocytes 10.00 0.00 - 13.40 %    Eosinophils 0.20 0.00 - 6.90 %    Basophils 0.20 0.00 - 1.80 %    Immature Granulocytes 0.50 0.00 - 0.90 %    Nucleated RBC 0.00 /100 WBC    Neutrophils (Absolute) 6.85 1.82 - 7.42 K/uL    Lymphs (Absolute) 1.40 1.00 - 4.80 K/uL    Monos (Absolute) 0.93 (H) 0.00 - 0.85 K/uL    Eos (Absolute) 0.02 0.00 - 0.51 K/uL    Baso (Absolute) 0.02 0.00 - 0.12 K/uL    Immature Granulocytes (abs) 0.05 0.00 - 0.11 K/uL    NRBC (Absolute) 0.00 K/uL   Comp Metabolic Panel (CMP)    Collection Time: 02/05/23  1:04 AM   Result Value Ref Range    Sodium 133 (L) 135 - 145 mmol/L    Potassium 3.6 3.6 - 5.5 mmol/L    Chloride 100 96 - 112 mmol/L    Co2 20 20 - 33 mmol/L     Anion Gap 13.0 7.0 - 16.0    Glucose 241 (H) 65 - 99 mg/dL    Bun 34 (H) 8 - 22 mg/dL    Creatinine 2.50 (H) 0.50 - 1.40 mg/dL    Calcium 8.7 8.5 - 10.5 mg/dL    AST(SGOT) 12 12 - 45 U/L    ALT(SGPT) 6 2 - 50 U/L    Alkaline Phosphatase 123 (H) 30 - 99 U/L    Total Bilirubin 0.7 0.1 - 1.5 mg/dL    Albumin 3.5 3.2 - 4.9 g/dL    Total Protein 6.1 6.0 - 8.2 g/dL    Globulin 2.6 1.9 - 3.5 g/dL    A-G Ratio 1.3 g/dL   LACTIC ACID    Collection Time: 02/05/23  1:04 AM   Result Value Ref Range    Lactic Acid 1.0 0.5 - 2.0 mmol/L   CORRECTED CALCIUM    Collection Time: 02/05/23  1:04 AM   Result Value Ref Range    Correct Calcium 9.1 8.5 - 10.5 mg/dL   ESTIMATED GFR    Collection Time: 02/05/23  1:04 AM   Result Value Ref Range    GFR (CKD-EPI) 29 (A) >60 mL/min/1.73 m 2   LACTIC ACID    Collection Time: 02/05/23  4:06 AM   Result Value Ref Range    Lactic Acid 1.0 0.5 - 2.0 mmol/L   POCT glucose device results    Collection Time: 02/05/23  6:23 AM   Result Value Ref Range    POC Glucose, Blood 100 (H) 65 - 99 mg/dL   LACTIC ACID    Collection Time: 02/05/23  8:20 AM   Result Value Ref Range    Lactic Acid 1.0 0.5 - 2.0 mmol/L       Imaging/Procedures Review:    Reviewed    MDM (Assessment and Plan):     Left Ureteral Stone  Left Hydronephrosis  BENTLEY    58 y.o. M with 3 mm obstructing left UVJ stone. Discussed option of MET vs surgical intervention with L PRASAD. Discussed risks and benefits of surgery, including risk of bleeding, infection, damaging surrounding structures and need to leave stent after surgery, along with associated stent discomfort of dysuria, frequency, incontinence, hematuria. Also discussed possibility of electing to leave stent and treat stone at a later time if pyonephrosis is present. Patient understands and is agreeable to proceed with surgery.     Plan:    -Please obtain patient surgical consent for left cystoscopy, ureteroscopy and laser lithotripsy with possible left ureteral stent  placement  -Patient to remain NPO while we look for surgical time, hopeful for today  -Urology to follow, appreciate the consult    Case discussed with patient, nurse, and Urology, Dr. Pierce--who has directed this plan of care.       Sarah Taylor PA-C  [facility] [place]

## 2023-02-05 NOTE — CONSULTS
[facility] -  CONSULTATION NOTE               Author: Victoriano Hernandez M.D. Date & Time: 2/5/2023  10:47 AM       REASON FOR CONSULTATION:   - BENTLEY    CHIEF COMPLAINT:   -Left flank and abdominal pain    HISTORY OF PRESENT ILLNESS:    58-year-old male with no previous history of kidney disease or kidney stones, history of diabetes mellitus (uncontrolled per patient, Hb A1C in double digits for a while per patient, did't use to take Metformin as it gave diarrhea, recently [facility] and was started on insulin 1 month ago), hypertension, was admitted from the ER where he presented on 2/4/2023 with sudden onset of intractable left flank and left lower quadrant abdominal pain.  Per patient he has been experiencing this pain for the past 2 to 3 days prior to admission it has been waxing and waning but became intolerable for him to seek help at the ER.  He denies any fever chills, no hematuria, no dysuria, no change in frequency of urination, no diarrhea or melena, no chest pain, no sore throat or cough.  He had a CT of the abdomen pelvis that demonstrated a 3 mm left UVJ stone with associated left-sided hydronephrosis and perinephric stranding.  Another possible 2 mm stone was seen in the bladder.  Labs showed that he had acute kidney injury on presentation with presenting creatinine of 3.32 and acidemia with bicarb of 16.  Nephrology was consulted for management of BENTLEY and possible pyelonephritis.  REVIEW OF SYSTEMS:    10 point ROS was performed and is as per HPI or otherwise negative    PAST MEDICAL HISTORY:   -Type 2 diabetes mellitus    PAST SURGICAL HISTORY:   -No significant surgery in the past    FAMILY HISTORY:   - Reviewed and non contributory to current illness    SOCIAL HISTORY:   - No tobacco  - No EtOH  - No illicits    HOME MEDICATIONS:   - Reviewed and documented in chart    LABORATORY STUDIES:   - Reviewed and documented in chart    ALLERGIES:  Penicillins    VS:  BP (!)  142/82   Pulse 74   Temp 36.6 °C (97.9 °F) (Temporal)   Resp 17   Wt 72.5 kg (159 lb 13.3 oz)   SpO2 94%   Physical Exam  Constitutional:       General: He is not in acute distress.     Appearance: Normal appearance. He is normal weight. He is not ill-appearing or toxic-appearing.   HENT:      Head: Normocephalic and atraumatic.      Right Ear: External ear normal.      Left Ear: External ear normal.      Nose: Nose normal.      Mouth/Throat:      Mouth: Mucous membranes are moist.      Pharynx: Oropharynx is clear.   Eyes:      General: No scleral icterus.     Conjunctiva/sclera: Conjunctivae normal.      Pupils: Pupils are equal, round, and reactive to light.   Cardiovascular:      Rate and Rhythm: Normal rate.      Pulses: Normal pulses.   Pulmonary:      Effort: Pulmonary effort is normal. No respiratory distress.      Breath sounds: Normal breath sounds. No stridor.   Abdominal:      General: There is no distension.      Palpations: Abdomen is soft.   Musculoskeletal:         General: No swelling or tenderness.      Cervical back: Neck supple.   Skin:     General: Skin is warm and dry.   Neurological:      Mental Status: He is alert and oriented to person, place, and time. Mental status is at baseline.   Psychiatric:         Mood and Affect: Mood normal.         Judgment: Judgment normal.       FLUID BALANCE:  In: 10 [P.O.:10]  Out: -     LABS:  Recent Labs     02/04/23  1704 02/05/23  0104   SODIUM 133* 133*   POTASSIUM 4.7 3.6   CHLORIDE 97 100   CO2 17* 20   GLUCOSE 220* 241*   BUN 37* 34*   CREATININE 3.32* 2.50*   CALCIUM 9.4 8.7        IMAGING:  - Imaging studies reviewed by me    IMPRESSION:  #BENTLEY   Secondary to postrenal obstruction due to impacted stone   #Left hydronephrosis   3 mm stone at left UVJ junction seen on CT abdominal pelvis on admission  #High anion gap metabolic acidosis   #Left pyelonephritis/perinephric stranding  #Nephrolithiasis  #Mild hyponatremia  #Type 2 diabetes mellitus -  uncontrolled, now on insulin   #Hypertension    PLAN:  - No compelling indication for RRT  -Kidney functions and acidemia has improved with overnight IV bicarb drip, will continue with IV bicarb drip (1/2NS +75mEq bicarb) @ 100cc/hr   -Urology consulted and planning for possible left cystoscopy, ureteroscopy and laser lithotripsy with possible left ureteral stent placement later today  -Check urine culture  -Will recommend primary team to start empiric IV antibiotics until Urine Cx results back (patient has underlying diabetes which is uncontrolled)   - Dose all meds per eGFR < 15    Thank you for the consultation!

## 2023-02-05 NOTE — ASSESSMENT & PLAN NOTE
Secondary to left UVJ nephrolithiasis noted on CT imaging along with hydronephrosis  Admit to medical floor  Diabetic diet  N.p.o. at midnight  IV hydration  Nephrology following, appreciate recommendations  Urology following, aim OR on a.m.  Labs on a.m.

## 2023-02-05 NOTE — PROGRESS NOTES
Assumed care of pt, oriented to floor and updated on POC. Pt is A&O x4, RA, ambulatory w/ no assist. All medications taken and tolerated, medicated for pain per MAR. Educated pt on NPO status, pt verbalized understanding. Pt resting in bed w/ call light and belongings in reach, treaded socks on, bed in lowest position. No additional needs at this time.

## 2023-02-05 NOTE — PROGRESS NOTES
4 Eyes Skin Assessment Completed by Dorothy RN and Rayray RN.    Head WDL  Ears WDL  Nose WDL  Mouth WDL  Neck WDL  Breast/Chest WDL  Shoulder Blades WDL  Spine WDL  (R) Arm/Elbow/Hand WDL  (L) Arm/Elbow/Hand WDL  Abdomen WDL  Groin WDL  Scrotum/Coccyx/Buttocks WDL  (R) Leg WDL  (L) Leg WDL  (R) Heel/Foot/Toe WDL  (L) Heel/Foot/Toe WDL          Devices In Places N/A      Interventions In Place Pillows and Pressure Redistribution Mattress    Possible Skin Injury No    Pictures Uploaded Into Epic N/A  Wound Consult Placed N/A  RN Wound Prevention Protocol Ordered No

## 2023-02-05 NOTE — PROGRESS NOTES
Hospital Medicine Daily Progress Note    Date of Service  2/5/2023    Chief Complaint  Flank pain    Hospital Course  58-year-old male with a past medical history of diabetes complicated by medicine noncompliance presents emergency department on 2/4/2023 with a 1 week history of worsening left lower abdominal and flank pain.  He associated symptoms with dysuria and frequent urination.  He does have a history of type 2 diabetes for which she was out of medicines for about a year but was restarted on Lantus and [facility] at [facility] a month ago which she has been compliant with.  Initially his sugars were in the 300s but now decreased down to 200s.  Associated symptoms with nausea along with 1 bout of emesis.     At the emergency department, vital signs with SBP in the 160s.  No leukocytosis or anemia on CBC.  Chemistry with hyperglycemia in the 220s, high anion gap metabolic acidosis with bicarbonate at 17 and hyponatremia 133.  Creatinine 3.32 and BUN 37.  Beta hydroxybutyric acid high at 3.47.  Renal colic CT showing moderate left hydronephrosis along with marked perinephric stranding extending inferiorly and a 3 mm obstructing left UVJ stone as well as possibly 2 mm bladder stones. EDP discussed case with Neprology (Dr. Hernandez) who recommended Trejo placement which was placed and start patient on bicarbonate drip which was started by EDP.  EDP discussed case with urology (Dr. Edmonds), who recommended placing patient n.p.o. at midnight for intervention in a.m. and holding off on IV antibiotics. Patient received 1 L bolus and a dose of morphine, ondansetron and was started on a bicarbonate drip.  Patient was admitted for acute renal failure secondary to left UVJ nephrolithiasis causing hydronephrosis and fat stranding which requires urgent urology evaluation for surgical intervention.    Interval Problem Update  2/5  Patient seen and examined.  Down for stent placement and lithotripsy today.  Vitals reviewed labs  reviewed.   Discussed with nephrology who requested empiric antibiotics pending culture placements based on fat stranding demonstrated on CT scan.  Ceftriaxone ordered x7 days renally dosed.    Continue bicarb drip 1/2NS +75 MEQ bicarb  Acidemia has improved.   a.m. labs      I have discussed this patient's plan of care and discharge plan at IDT rounds today with Case Management, Nursing, Nursing leadership, and other members of the IDT team.    Consultants/Specialty  nephrology and urology    Code Status  Full Code    Disposition  Patient is not medically cleared for discharge.   Anticipate discharge to to home with close outpatient follow-up.  I have placed the appropriate orders for post-discharge needs.    Review of Systems  ROS     Physical Exam  Temp:  [36.3 °C (97.3 °F)-36.8 °C (98.2 °F)] 36.7 °C (98.1 °F)  Pulse:  [66-94] 68  Resp:  [10-18] 18  BP: (105-176)/(59-94) 127/76  SpO2:  [92 %-99 %] 93 %    Physical Exam    Fluids    Intake/Output Summary (Last 24 hours) at 2/5/2023 1353  Last data filed at 2/5/2023 1319  Gross per 24 hour   Intake 210 ml   Output 5 ml   Net 205 ml       Laboratory  Recent Labs     02/04/23  1704 02/05/23  0104   WBC 9.9 9.3   RBC 4.58* 4.00*   HEMOGLOBIN 14.7 13.0*   HEMATOCRIT 43.0 36.7*   MCV 93.9 91.8   MCH 32.1 32.5   MCHC 34.2 35.4*   RDW 43.4 40.9   PLATELETCT 250 221   MPV 10.8 10.7     Recent Labs     02/04/23  1704 02/05/23  0104   SODIUM 133* 133*   POTASSIUM 4.7 3.6   CHLORIDE 97 100   CO2 17* 20   GLUCOSE 220* 241*   BUN 37* 34*   CREATININE 3.32* 2.50*   CALCIUM 9.4 8.7                   Imaging  CT-RENAL COLIC EVALUATION(A/P W/O)   Final Result      1.  Moderate left hydronephrosis. Marked perinephric stranding extending inferiorly. Punctate nonobstructing renal calculus in the left lower pole. 3 mm obstructive left UVJ stone. Possible 2 mm bladder stone dependently posteriorly on the right.      DX-CYSTO FLUORO > 1 HOUR    (Results Pending)        Assessment/Plan  *  Acute renal failure (ARF) (HCC)- (present on admission)  Assessment & Plan  Secondary to left UVJ nephrolithiasis noted on CT imaging along with hydronephrosis  Admit to medical floor  Diabetic diet  N.p.o. at midnight  IV hydration  Nephrology following, appreciate recommendations  Urology following, aim OR on a.m.  Labs on a.m.    Hyponatremia- (present on admission)  Assessment & Plan  Mild and likely secondary to BENTLEY versus poor p.o. intake  IV hydration  Labs in a.m.    High anion gap metabolic acidosis- (present on admission)  Assessment & Plan  Likely secondary to mild DKA in the setting of diabetic medicine noncompliance and high hydroxybutyric acid versus lactic acidosis  Pending lactic acid  IV hydration and insulin  Was started on bicarbonate drip as per nephrology's recommendation  Labs on a.m.    Type 2 diabetes mellitus (HCC)- (present on admission)  Assessment & Plan  Admits to medication noncompliance  Diabetic diet  Continue home Lantus  Insulin sliding scale  Repeat A1c    Nephrolithiasis- (present on admission)  Assessment & Plan  Left UVJ stone noted on CT imaging which is obstructing and causing hydronephrosis  Urology following  Management as per above    Hydronephrosis of right kidney- (present on admission)  Assessment & Plan  Secondary to left UVJ stone  Management as per above         VTE prophylaxis: enoxaparin ppx    I have performed a physical exam and reviewed and updated ROS and Plan today (2/5/2023). In review of yesterday's note (2/4/2023), there are no changes except as documented above.

## 2023-02-05 NOTE — OR SURGEON
Immediate Post OP Note    PreOp Diagnosis: left ureteral stone, renal insufficiency      PostOp Diagnosis: same      Procedure(s):  CYSTOSCOPY, WITH URETERAL STENT INSERTION  URETEROSCOPY  Stone basketing    Surgeon(s):  Pranav Pierce M.D.    Anesthesiologist/Type of Anesthesia:  Anesthesiologist: Branden Wayne M.D./* No anesthesia type entered *    Surgical Staff:  Circulator: Cristiana Ramos R.N.  Laser Staff: Luis Antonio Sandoval  Scrub Person: Vicky Stinson    Specimens removed if any:  * No specimens in log *    Estimated Blood Loss: min    Findings: stenotic left UO, dilated. Stone removed.  24x6 stent with strings    Complications: none        2/5/2023 1:09 PM Pranav Pierce M.D.

## 2023-02-05 NOTE — ED NOTES
erp made aware pt wants to leave AMA after his fluids are done, pt states if we are just going to talk to a specialist he can talk to the over the phone.  erp in room talking with pt.

## 2023-02-05 NOTE — ASSESSMENT & PLAN NOTE
Left UVJ stone noted on CT imaging which is obstructing and causing hydronephrosis  Urology following  Management as per above

## 2023-02-05 NOTE — ED PROVIDER NOTES
ED Provider Note    CHIEF COMPLAINT  Chief Complaint   Patient presents with    Flank Pain       EXTERNAL RECORDS REVIEWED  No notes in old records since 2016    HPI/ROS  LIMITATION TO HISTORY   Select: : None  OUTSIDE HISTORIAN(S):  Significant other who states that the patient was able to urinate yesterday and today 2 days before that unable to urinate for 2 days.    Mandeep Ralph is a 58 y.o. male who presents planing of left lower quadrant abdominal pain rating into his back that started a week ago.  He states the pain is 10 out of 10.  He states over the last week he has had trouble urinating.  He denies any diarrhea or constipation.  The patient has had colonoscopies in the past that had some polyps but no other abnormalities.  He is diabetic.  He dates that he has not taken his diabetes medications for the last week because he has been having so much trouble with his abdominal pain and pain with urination..  He has had 1 episode of vomiting.  He denies any fevers or chills.  He has no chest pain sore throat cough or cold symptoms.  He denies any pain in the testicles or bulging in his abdomen.  He still has his gallbladder and appendix.  Patient gets [facility] medical [facility] at the [facility].    PAST MEDICAL HISTORY   has a past medical history of Diabetes (HCC).    SURGICAL HISTORY  patient denies any surgical history    FAMILY HISTORY  History reviewed. No pertinent family history.    SOCIAL HISTORY  Social History     Tobacco Use    Smoking status: Never    Smokeless tobacco: Never   Substance and Sexual Activity    Alcohol use: Not Currently    Drug use: Not Currently    Sexual activity: Not on file       CURRENT MEDICATIONS  Home Medications       Reviewed by Melissa Carpenter R.N. (Registered Nurse) on 02/04/23 at 1637  Med List Status: <None>     Medication Last Dose Status   glimepiride (AMARYL) 4 MG Tab  Active   metformin (GLUCOPHAGE) 1000 MG tablet  Active   sitagliptin (JANUVIA) 100 MG TABS  Active                     ALLERGIES  Allergies   Allergen Reactions    Penicillins        PHYSICAL EXAM  VITAL SIGNS: BP (!) 161/77   Pulse 83   Temp 36.8 °C (98.2 °F) (Temporal)   Resp 16   Wt 70 kg (154 lb 5.2 oz)   SpO2 96%      Constitutional: Well developed, Well nourished, No acute distress, Non-toxic appearance.   HEENT: Normocephalic, Atraumatic,  external ears normal, pharynx pink,  Mucous  Membranes moist, No rhinorrhea or mucosal edema  Eyes: PERRL, EOMI, Conjunctiva normal, No discharge.   Neck: Normal range of motion, No tenderness, Supple, No stridor.   Lymphatic: No lymphadenopathy    Cardiovascular: Regular Rate and Rhythm, No murmurs,  rubs, or gallops.   Thorax & Lungs: Lungs clear to auscultation bilaterally, No respiratory distress, No wheezes, rhales or rhonchi, No chest wall tenderness.   Abdomen: Bowel sounds normal, Soft, positive left lower quadrant pain to palpation no rebound masses or peritoneal signs, non distended,  No pulsatile masses.,    Skin: Warm, Dry, No erythema, No rash,   Back:  No CVA tenderness,  No spinal tenderness, bony crepitance step offs or instability.   : No scrotal erythema or testicular pain normal cremasteric reflexes no hernias penile lesions  Extremities: Equal, intact distal pulses, No cyanosis, clubbing or edema,  No tenderness.   Musculoskeletal: Good range of motion in all major joints. No tenderness to palpation or major deformities noted.   Neurologic: Alert & oriented x 3,  No focal deficits noted.   Psychiatric: Affect normal, Judgment normal, Mood normal.      DIAGNOSTIC STUDIES / PROCEDURES  EKG  I have independently interpreted this EKG  None    LABS  Results for orders placed or performed during the hospital encounter of 02/04/23   CBC WITH DIFFERENTIAL   Result Value Ref Range    WBC 9.9 4.8 - 10.8 K/uL    RBC 4.58 (L) 4.70 - 6.10 M/uL    Hemoglobin 14.7 14.0 - 18.0 g/dL    Hematocrit 43.0 42.0 - 52.0 %    MCV 93.9 81.4 - 97.8 fL    MCH 32.1 27.0 -  33.0 pg    MCHC 34.2 33.7 - 35.3 g/dL    RDW 43.4 35.9 - 50.0 fL    Platelet Count 250 164 - 446 K/uL    MPV 10.8 9.0 - 12.9 fL    Neutrophils-Polys 87.50 (H) 44.00 - 72.00 %    Lymphocytes 7.70 (L) 22.00 - 41.00 %    Monocytes 4.10 0.00 - 13.40 %    Eosinophils 0.00 0.00 - 6.90 %    Basophils 0.10 0.00 - 1.80 %    Immature Granulocytes 0.60 0.00 - 0.90 %    Nucleated RBC 0.00 /100 WBC    Neutrophils (Absolute) 8.64 (H) 1.82 - 7.42 K/uL    Lymphs (Absolute) 0.76 (L) 1.00 - 4.80 K/uL    Monos (Absolute) 0.40 0.00 - 0.85 K/uL    Eos (Absolute) 0.00 0.00 - 0.51 K/uL    Baso (Absolute) 0.01 0.00 - 0.12 K/uL    Immature Granulocytes (abs) 0.06 0.00 - 0.11 K/uL    NRBC (Absolute) 0.00 K/uL   COMP METABOLIC PANEL   Result Value Ref Range    Sodium 133 (L) 135 - 145 mmol/L    Potassium 4.7 3.6 - 5.5 mmol/L    Chloride 97 96 - 112 mmol/L    Co2 17 (L) 20 - 33 mmol/L    Anion Gap 19.0 (H) 7.0 - 16.0    Glucose 220 (H) 65 - 99 mg/dL    Bun 37 (H) 8 - 22 mg/dL    Creatinine 3.32 (H) 0.50 - 1.40 mg/dL    Calcium 9.4 8.5 - 10.5 mg/dL    AST(SGOT) 13 12 - 45 U/L    ALT(SGPT) 8 2 - 50 U/L    Alkaline Phosphatase 148 (H) 30 - 99 U/L    Total Bilirubin 0.9 0.1 - 1.5 mg/dL    Albumin 4.1 3.2 - 4.9 g/dL    Total Protein 7.6 6.0 - 8.2 g/dL    Globulin 3.5 1.9 - 3.5 g/dL    A-G Ratio 1.2 g/dL   LIPASE   Result Value Ref Range    Lipase 20 11 - 82 U/L   URINALYSIS    Specimen: Urine   Result Value Ref Range    Color Yellow     Character Clear     Specific Gravity 1.015 <1.035    Ph 5.5 5.0 - 8.0    Glucose Negative Negative mg/dL    Ketones 40 (A) Negative mg/dL    Protein Negative Negative mg/dL    Bilirubin Negative Negative    Urobilinogen, Urine 0.2 Negative    Nitrite Negative Negative    Leukocyte Esterase Negative Negative    Occult Blood Moderate (A) Negative    Micro Urine Req Microscopic    ESTIMATED GFR   Result Value Ref Range    GFR (CKD-EPI) 21 (A) >60 mL/min/1.73 m 2   CORRECTED CALCIUM   Result Value Ref Range    Correct  Calcium 9.3 8.5 - 10.5 mg/dL   URINE MICROSCOPIC (W/UA)   Result Value Ref Range    WBC 10-20 (A) /hpf    RBC 20-50 (A) /hpf    Bacteria Negative None /hpf    Epithelial Cells Few /hpf    Hyaline Cast 0-2 /lpf   BETA-HYDROXYBUTYRIC ACID   Result Value Ref Range    beta-Hydroxybutyric Acid 3.47 (H) 0.02 - 0.27 mmol/L        RADIOLOGY  I have independently interpreted the diagnostic imaging associated with this visit and am waiting the final reading from the radiologist.   My preliminary interpretation is a follows: CT renal colic: left sided hydronephrosis  Radiologist interpretation:   CT-RENAL COLIC EVALUATION(A/P W/O)   Final Result      1.  Moderate left hydronephrosis. Marked perinephric stranding extending inferiorly. Punctate nonobstructing renal calculus in the left lower pole. 3 mm obstructive left UVJ stone. Possible 2 mm bladder stone dependently posteriorly on the right.            COURSE & MEDICAL DECISION MAKING    ED Observation Status? Yes; I am placing the patient in to an observation status due to a diagnostic uncertainty as well as therapeutic intensity. Patient placed in observation status at 5:28 PM, 2/4/2023.     Observation plan is as follows: Renal colic CT, pain medication CBC CMP lipase and urinalysis.    Upon Reevaluation, the patient's condition has: not improved; and will be escalated to hospitalization.    Patient discharged from ED Observation status at 8:24 PM  (Time) 2/4/23 (Date).     INITIAL ASSESSMENT, COURSE AND PLAN  Care Narrative: Is a 58-year-old male coming in with 1 day of left lower quadrant and flank pain with vomiting yesterday and inability urinate for 2 days about 2 days ago.  Patient states he has not noticed any blood in his urine or foul smell to his urine.  He has not had any fevers or chills.  He is nauseated and vomiting.  Exam reveals a soft abdomen to palpation without any rebound masses or peritoneal signs he has no palpable hernias.  Since his pain is 10 out  of 10 I have ordered an IV with morphine and Zofran.  Because of his urinary retention I have ordered a bladder scan postvoid and because of his symptoms I have ordered a renal colic CT to evaluate for kidney stones or other other abdominal pathology.     Differential diagnosis: Pyelonephritis, hydronephrosis, urinary retention, kidney stone, diverticulitis, colitis, prostatitis, BENTLEY, DKA    ADDITIONAL PROBLEM LIST  Diabetes  High cholesterol  DISPOSITION AND DISCUSSIONS    Post void bladder scan was performed and he had 400 cc of urine left in his bladder.  Is a normal white count at 9.9 however he has elevated neutrophils at 87.5.  Patient's sugar is elevated at 220 with a slightly low CO2 at 17.  Sodium is low at 133 anion gap is 19 creatinine is 3.3 with a BUN of 37.  The patient's urine has 10-20 white cells 20-50 RBCs and moderate occult blood.  I have ordered IV fluids to help decrease the patient's blood sugar.      I have discussed management of the patient with the following physicians and CALEB's:    Hospitalist Dr Doshi for admission,   2. Nephrologist Dr. Hernandez [facility] who would like the patient to have a edmondson catheter placed and wants the patient started on a sodium bicarb drip at 150cc/hr overnight. He will consult on the patient in the morning for evaluation of BENTLEY,   3. Urology Dr. Anthony for left renal hydronephrosis with 3mm left uvj stone.  Does not recommend antibiotics at this time as the patient has no evidence of urinary tract infection no leukocytosis.  He wants the patient to be n.p.o. after midnight for stone retrieval in the morning if he has not passed it.    Discussion of management with other Q or appropriate source(s): None     Escalation of care considered, and ultimately not performed: none    Barriers to care at this time, including but not limited to:  none .     Decision tools and prescription drugs considered including, but not limited to:  none .    Critical Care  Due  to the real possibility of a deterioration of this patient's condition required the highest level of my preparedness for sudden emergent intervention. I provided critical care services which included medication orders, frequent reevaluations of the patient's condition and response to treatment, ordering and reviewing test results and discussing the case with various consultants. The critical care time associated with the care of the patient was 40 minutes. Review chart for interventions. This time is exclusive of any other billable procedures.      FINAL DIAGNOSIS  1. Left flank pain    2. BENTLEY (acute kidney injury) (HCC)    3. Hyperglycemia    4. Ureterolithiasis           Electronically signed by: Zunilda Smith M.D., 2/4/2023 5:19 PM

## 2023-02-05 NOTE — H&P
Hospital Medicine History & Physical Note    Date of Service  2/4/2023    Primary Care Physician  Pcp Pt States None    Consultants  Nephrology  Urology    Code Status  Full Code    Chief Complaint  Chief Complaint   Patient presents with    Flank Pain       History of Presenting Illness  58-year-old male with a past medical history of diabetes complicated by medicine noncompliance presents emergency department on 2/4/2023 with a 1 week history of worsening left lower abdominal and flank pain.  He associated symptoms with dysuria and frequent urination.  He does have a history of type 2 diabetes for which she was out of medicines for about a year but was [facility] which she has been compliant with.  Initially his sugars were in the 300s but now decreased down to 200s.  Associated symptoms with nausea along with 1 bout of emesis.    At the emergency department, vital signs with SBP in the 160s.  No leukocytosis or anemia on CBC.  Chemistry with hyperglycemia in the 220s, high anion gap metabolic acidosis with bicarbonate at 17 and hyponatremia 133.  Creatinine 3.32 and BUN 37.  Beta hydroxybutyric acid high at 3.47.  Renal colic CT showing moderate left hydronephrosis along with marked perinephric stranding extending inferiorly and a 3 mm obstructing left UVJ stone as well as possibly 2 mm bladder stones. EDP discussed case with Neprology (Dr. Hernandez) who recommended Trejo placement which was placed and start patient on bicarbonate drip which was started by EDP.  EDP discussed case with urology (Dr. Edmonds), who recommended placing patient n.p.o. at midnight for intervention in a.m. and holding off on IV antibiotics. Patient received 1 L bolus and a dose of morphine, ondansetron and was started on a bicarbonate drip.  Patient was admitted for acute renal failure secondary to left UVJ nephrolithiasis causing hydronephrosis and fat stranding which requires urgent urology  evaluation for surgical intervention.    I discussed the plan of care with patient and bedside RN.    Review of Systems  Review of Systems   Constitutional: Negative.    HENT: Negative.     Eyes: Negative.    Respiratory: Negative.     Cardiovascular: Negative.    Gastrointestinal:  Positive for abdominal pain, nausea and vomiting.   Genitourinary:  Positive for dysuria and flank pain.   Skin: Negative.    Neurological: Negative.    Endo/Heme/Allergies: Negative.    Psychiatric/Behavioral: Negative.       Past Medical History   has a past medical history of Diabetes (HCC).    Surgical History   has no past surgical history on file.     Family History  Family history reviewed with patient. There is no family history that is pertinent to the chief complaint.     Social History   reports that he has never smoked. He has never used smokeless tobacco. He reports that he does not currently use alcohol. He reports that he does not currently use drugs.    Allergies  Allergies   Allergen Reactions    Penicillins        Medications  Prior to Admission Medications   Prescriptions Last Dose Informant Patient Reported? Taking?   glimepiride (AMARYL) 4 MG Tab   No No   Sig: Take 1 Tab by mouth every morning.   metformin (GLUCOPHAGE) 1000 MG tablet   No No   Sig: Take 1 Tab by mouth 2 times a day, with meals.   sitagliptin (JANUVIA) 100 MG TABS   No No   Sig: Take 1 Tab by mouth every day. Patient needs a office visit before meds can be refilled again.      Facility-Administered Medications: None       Physical Exam  Temp:  [36.8 °C (98.2 °F)] 36.8 °C (98.2 °F)  Pulse:  [83-94] 83  Resp:  [16] 16  BP: (147-161)/(77-82) 161/77  SpO2:  [96 %-98 %] 96 %  Blood Pressure: (!) 161/77   Temperature: 36.8 °C (98.2 °F)   Pulse: 83   Respiration: 16   Pulse Oximetry: 96 %       Physical Exam  Constitutional:       General: He is not in acute distress.     Appearance: Normal appearance.   HENT:      Head: Normocephalic and atraumatic.       Nose: Nose normal. No congestion.      Mouth/Throat:      Mouth: Mucous membranes are moist.   Eyes:      Extraocular Movements: Extraocular movements intact.      Pupils: Pupils are equal, round, and reactive to light.   Cardiovascular:      Rate and Rhythm: Normal rate and regular rhythm.      Pulses: Normal pulses.      Heart sounds: Normal heart sounds.   Pulmonary:      Effort: Pulmonary effort is normal.      Breath sounds: Normal breath sounds.   Abdominal:      General: Bowel sounds are normal. There is no distension.      Palpations: Abdomen is soft.      Tenderness: There is no abdominal tenderness. There is left CVA tenderness. There is no right CVA tenderness.   Musculoskeletal:         General: No swelling. Normal range of motion.      Cervical back: Normal range of motion and neck supple.   Skin:     General: Skin is warm.      Coloration: Skin is not jaundiced.   Neurological:      General: No focal deficit present.      Mental Status: He is alert and oriented to person, place, and time. Mental status is at baseline.      Cranial Nerves: No cranial nerve deficit.   Psychiatric:         Mood and Affect: Mood normal.         Behavior: Behavior normal.         Thought Content: Thought content normal.         Judgment: Judgment normal.       Laboratory:  Recent Labs     02/04/23  1704   WBC 9.9   RBC 4.58*   HEMOGLOBIN 14.7   HEMATOCRIT 43.0   MCV 93.9   MCH 32.1   MCHC 34.2   RDW 43.4   PLATELETCT 250   MPV 10.8     Recent Labs     02/04/23  1704   SODIUM 133*   POTASSIUM 4.7   CHLORIDE 97   CO2 17*   GLUCOSE 220*   BUN 37*   CREATININE 3.32*   CALCIUM 9.4     Recent Labs     02/04/23  1704   ALTSGPT 8   ASTSGOT 13   ALKPHOSPHAT 148*   TBILIRUBIN 0.9   LIPASE 20   GLUCOSE 220*         No results for input(s): NTPROBNP in the last 72 hours.      No results for input(s): TROPONINT in the last 72 hours.    Imaging:  CT-RENAL COLIC EVALUATION(A/P W/O)   Final Result      1.  Moderate left hydronephrosis.  Marked perinephric stranding extending inferiorly. Punctate nonobstructing renal calculus in the left lower pole. 3 mm obstructive left UVJ stone. Possible 2 mm bladder stone dependently posteriorly on the right.        Assessment/Plan:  Justification for Admission Status  I anticipate this patient will require at least two midnights for appropriate medical management, necessitating inpatient admission because of below      * Acute renal failure (ARF) ([facility])- (present on admission)  Assessment & Plan  Secondary to left UVJ nephrolithiasis noted on CT imaging along with hydronephrosis  Admit to medical floor  Diabetic diet  N.p.o. at midnight  IV hydration  Nephrology following, appreciate recommendations  Urology following, aim OR on a.m.  Labs on a.m.    High anion gap metabolic acidosis- (present on admission)  Assessment & Plan  Likely secondary to mild DKA in the setting of diabetic medicine noncompliance and high hydroxybutyric acid versus lactic acidosis  Pending lactic acid  IV hydration and insulin  Was started on bicarbonate drip as per nephrology's recommendation  Labs on a.m.    Nephrolithiasis- (present on admission)  Assessment & Plan  Left UVJ stone noted on CT imaging which is obstructing and causing hydronephrosis  Urology following  Management as per above    Hydronephrosis of right kidney- (present on admission)  Assessment & Plan  Secondary to left UVJ stone  Management as per above    Hyponatremia- (present on admission)  Assessment & Plan  Mild and likely secondary to BENTLEY versus poor p.o. intake  IV hydration  Labs in a.m.    Type 2 diabetes mellitus (HCC)- (present on admission)  Assessment & Plan  Admits to medication noncompliance  Diabetic diet  Continue home Lantus  Insulin sliding scale  Repeat A1c      VTE prophylaxis: SCDs/TEDs

## 2023-02-05 NOTE — ANESTHESIA TIME REPORT
Anesthesia Start and Stop Event Times     Date Time Event    2/5/2023 1159 Ready for Procedure     1226 Anesthesia Start     1319 Anesthesia Stop        Responsible Staff  02/05/23    Name Role Begin End    Branden Wayne M.D. Anesth 1226 1319        Overtime Reason:  no overtime (within assigned shift)    Comments:

## 2023-02-05 NOTE — ANESTHESIA PROCEDURE NOTES
Airway    Date/Time: 2/5/2023 12:35 PM  Performed by: Branden Wayne M.D.  Authorized by: Branden Wayne M.D.     Location:  OR  Urgency:  Elective  Indications for Airway Management:  Anesthesia      Spontaneous Ventilation: absent    Sedation Level:  Deep  Preoxygenated: Yes    Final Airway Type:  Supraglottic airway  Final Supraglottic Airway:  Standard LMA    SGA Size:  4  Number of Attempts at Approach:  1

## 2023-02-06 LAB — PATHOLOGY CONSULT NOTE: NORMAL

## 2023-02-06 NOTE — ANESTHESIA POSTPROCEDURE EVALUATION
Patient: Mandeep Ralph    Procedure Summary     Date: 02/05/23 Room / Location: [place] [address] / SURGERY [facility]    Anesthesia Start: 1226 Anesthesia Stop: 1319    Procedures:       CYSTOSCOPY, WITH URETERAL STENT INSERTION (Left: Bladder)      URETEROSCOPY (Left: Bladder) Diagnosis: (left ureteral stone and renal insuffiency)    Surgeons: Pranav Pierce M.D. Responsible Provider: Branden Wayne M.D.    Anesthesia Type: general ASA Status: 3 - Emergent          Final Anesthesia Type: general  Last vitals  BP   Blood Pressure: (!) 150/87    Temp   35.9 °C (96.6 °F)    Pulse   77   Resp   18    SpO2   96 %      Anesthesia Post Evaluation    Patient location during evaluation: PACU  Patient participation: complete - patient participated  Level of consciousness: sleepy but conscious  Pain score: 0    Airway patency: patent  Anesthetic complications: no  Cardiovascular status: hemodynamically stable  Respiratory status: acceptable  Hydration status: balanced    PONV: none          There were no known notable events for this encounter.     Nurse Pain Score: 0 (NPRS)

## 2023-02-06 NOTE — OP REPORT
DATE OF SERVICE:  02/05/2023     NAME OF OPERATIONS:  Left ureteroscopy with stone basketing and left ureteral   stent.     PREOPERATIVE DIAGNOSES:  1.  Left distal ureteral stone.  2.  Renal insufficiency.     POSTOPERATIVE DIAGNOSES:  1.  Left distal ureteral stone.  2.  Renal insufficiency.     PRIMARY SURGEON:  Pranav Pierce MD     ANESTHESIOLOGIST:  Branden Wayne MD     FINDINGS:   1.  Stenotic left ureteral orifice easily dilated.  2.  A 3-mm stone of the left distal ureter basketed free.  3.  A 24 cm x 6-[place] ureteral stent with strings.     INDICATIONS:  Briefly, the patient is a 58-year-old gentleman who presented   with left-sided renal colic.  He also have renal insufficiency.  Upon   consideration of his options, the patient elected to undergo surgical   management.  Informed consent has been obtained.     OPERATION IN DETAIL:  The patient was taken to the operating room and placed   on the operating table in supine position.  After administration of general   anesthetic, he was placed in lithotomy.  Genitals were prepped and draped   sterilely.  A 22-Gh[place]ian cystoscope was passed in bladder with visualizing   obturator.  Urethra and prostate were within normal limits.  Within the   bladder, no stones or tumors were identified.  There were 2 tiny blood clots.     Left ureteral orifice was fairly difficult to see in a slightly edematous left   trigone.  Ultimately, I was able to cannulate it with the wire.  There was an   immediate rush of fluid from this left side.     A [place] One-Step dilator was used to gently dilate the distal ureter.    This was removed and a rigid ureteroscope was passed into the distal ureter.    Here, the ureter was dilated and the stone was evident.  It was of a size that   was amenable to stone basketing and removal.     With the safety wire remaining in place, a 24 cm x 6-Gh[place]ian ureteral stent was   placed over it.  Its proximal J was seen curling in the  left renal pelvis and   its distal J in the bladder.  Bladder was drained.  Stone fragments were   retrieved and the case concluded.  He tolerated the procedure well and was   taken to recovery in stable condition.     Strings were left attached to the stent with the intention of being removed in   the office in approximately 5 days' time.  We anticipate discharge later   today by the hospitalist service.  We will follow up with a repeat kidney   function test near the time of stent removal.        ______________________________  Pranav Pierce MD MCM/NIC/KI    DD:  02/05/2023 13:14  DT:  02/05/2023 14:02    Job#:  [id number]

## 2023-02-06 NOTE — DISCHARGE SUMMARY
Discharge Summary    CHIEF COMPLAINT ON ADMISSION  Chief Complaint   Patient presents with    Flank Pain       Reason for Admission  Flank Pain     Admission Date  2/4/2023    CODE STATUS  Prior    HPI & HOSPITAL COURSE  58-year-old male with a past medical history of diabetes complicated by medicine noncompliance presents emergency department on 2/4/2023 with a 1 week history of worsening left lower abdominal and flank pain.  He associated symptoms with dysuria and frequent urination.  He does have a history of type 2 diabetes for which she was out of medicines for about a year but was [facility] which she has been compliant with.  Initially his sugars were in the 300s but now decreased down to 200s.  Associated symptoms with nausea along with 1 bout of emesis.     At the emergency department, vital signs with SBP in the 160s.  No leukocytosis or anemia on CBC.  Chemistry with hyperglycemia in the 220s, high anion gap metabolic acidosis with bicarbonate at 17 and hyponatremia 133.  Creatinine 3.32 and BUN 37.  Beta hydroxybutyric acid high at 3.47.  Renal colic CT showing moderate left hydronephrosis along with marked perinephric stranding extending inferiorly and a 3 mm obstructing left UVJ stone as well as possibly 2 mm bladder stones. EDP discussed case with Neprology (Dr. Hernandez) who recommended Trejo placement which was placed and start patient on bicarbonate drip which was started by EDP.  EDP discussed case with urology (Dr. Edmonds), who recommended placing patient n.p.o. at midnight for intervention in a.m. and holding off on IV antibiotics. Patient received 1 L bolus and a dose of morphine, ondansetron and was started on a bicarbonate drip.  Patient was admitted for acute renal failure secondary to left UVJ nephrolithiasis causing hydronephrosis and fat stranding which requires urgent urology evaluation for surgical intervention.    A left ureteral stent was  placed and stone was removed surgically.     After the procedure the patient elected to leave A.     Therefore, he is discharged in good and stable condition to home with close outpatient follow-up.    The patient recovered much more quickly than anticipated on admission.    Discharge Date  2/5/2023    FOLLOW UP ITEMS POST DISCHARGE  F/U with Urologist    DISCHARGE DIAGNOSES  Principal Problem:    Acute renal failure (ARF) ([facility]) POA: Yes  Active Problems:    Hydronephrosis of right kidney POA: Yes    Nephrolithiasis POA: Yes    Type 2 diabetes mellitus (HCC) POA: Yes    High anion gap metabolic acidosis POA: Yes    Hyponatremia POA: Yes  Resolved Problems:    * No resolved hospital problems. *      FOLLOW UP  No future appointments.  Pcp Pt States None            MEDICATIONS ON DISCHARGE     Medication List        ASK your doctor about these medications        Instructions   glimepiride 4 MG Tabs  Commonly known as: AMARYL   Take 1 Tab by mouth every morning.  Dose: 4 mg     metformin 1000 MG tablet  Commonly known as: GLUCOPHAGE   Take 1 Tab by mouth 2 times a day, with meals.  Dose: 1,000 mg     SITagliptin 100 MG Tabs  Commonly known as: Januvia   Take 1 Tab by mouth every day. Patient needs a office visit before meds can be refilled again.  Dose: 100 mg              Allergies  Allergies   Allergen Reactions    Penicillins        DIET  No orders of the defined types were placed in this encounter.      ACTIVITY  As tolerated.  Weight bearing as tolerated    CONSULTATIONS  Urology    PROCEDURES  Left Ureteral stent placed surgically    LABORATORY  Lab Results   Component Value Date    SODIUM 133 (L) 02/05/2023    POTASSIUM 3.6 02/05/2023    CHLORIDE 100 02/05/2023    CO2 20 02/05/2023    GLUCOSE 241 (H) 02/05/2023    BUN 34 (H) 02/05/2023    CREATININE 2.50 (H) 02/05/2023        Lab Results   Component Value Date    WBC 9.3 02/05/2023    HEMOGLOBIN 13.0 (L) 02/05/2023    HEMATOCRIT 36.7 (L) 02/05/2023     PLATELETCT 221 02/05/2023        Total time of the discharge process was 21 minutes.

## 2023-02-07 LAB
BACTERIA UR CULT: NORMAL
SIGNIFICANT IND 70042: NORMAL
SITE SITE: NORMAL
SOURCE SOURCE: NORMAL

## 2023-02-09 LAB
APPEARANCE STONE: NORMAL
COMPN STONE: NORMAL
SPECIMEN WT: 12 MG

## (undated) DEVICE — PACK CYSTOSCOPY III - (8/CA)

## (undated) DEVICE — BASKET ZERO TIP

## (undated) DEVICE — TOWEL STOP TIMEOUT SAFETY FLAG (40EA/CA)

## (undated) DEVICE — CANISTER SUCTION 3000ML MECHANICAL FILTER AUTO SHUTOFF MEDI-VAC NONSTERILE LF DISP  (40EA/CA)

## (undated) DEVICE — CONNECTOR HOSE NEPTUNE FOR CYSTO ROOM

## (undated) DEVICE — LACTATED RINGERS INJ 1000 ML - (14EA/CA 60CA/PF)

## (undated) DEVICE — COVER FOOT UNIVERSAL DISP. - (25EA/CA)

## (undated) DEVICE — SET EXTENSION WITH 2 PORTS (48EA/CA) ***PART #2C8610 IS A SUBSTITUTE*****

## (undated) DEVICE — SYRINGE DISP. 12 CC LL - (100/BX)

## (undated) DEVICE — GLOVE BIOGEL SZ 7.5 SURGICAL PF LTX - (50PR/BX 4BX/CA)

## (undated) DEVICE — SET IRRIGATION CYSTOSCOPY Y-TYPE L81 IN (20EA/CA)

## (undated) DEVICE — CATHETER URET OPEN END 6FR (10EA/BX)

## (undated) DEVICE — SENSOR OXIMETER ADULT SPO2 RD SET (20EA/BX)

## (undated) DEVICE — TUBING CLEARLINK DUO-VENT - C-FLO (48EA/CA)

## (undated) DEVICE — WATER IRRIG. STER 3000 ML - (4/CA)

## (undated) DEVICE — SUCTION INSTRUMENT YANKAUER BULBOUS TIP W/O VENT (50EA/CA)

## (undated) DEVICE — SET LEADWIRE 5 LEAD BEDSIDE DISPOSABLE ECG (1SET OF 5/EA)

## (undated) DEVICE — CONTAINER SPECIMEN BAG OR - STERILE 4 OZ W/LID (100EA/CA)

## (undated) DEVICE — SODIUM CHL. IRRIGATION 0.9% 3000ML (4EA/CA 65CA/PF)

## (undated) DEVICE — BAG URODRAIN WITH TUBING - (20/CA)

## (undated) DEVICE — JELLY SURGILUBE STERILE TUBE 4.25 OZ (1/EA)

## (undated) DEVICE — SPONGE GAUZESTER 4 X 4 4PLY - (128PK/CA)

## (undated) DEVICE — WATER IRRIGATION STERILE 1000ML (12EA/CA)

## (undated) DEVICE — GOWN SURGEONS X-LARGE - DISP. (30/CA)

## (undated) DEVICE — SLEEVE, VASO, THIGH, MED

## (undated) DEVICE — GOWN WARMING STANDARD FLEX - (30/CA)

## (undated) DEVICE — WIRE GUIDE SENSOR DUAL FLEX - 5/BX

## (undated) DEVICE — GLOVE BIOGEL PI INDICATOR SZ 6.5 SURGICAL PF LF - (50/BX 4BX/CA)

## (undated) DEVICE — PACK CYSTO III (2EA/CA)